# Patient Record
Sex: MALE | Race: AMERICAN INDIAN OR ALASKA NATIVE
[De-identification: names, ages, dates, MRNs, and addresses within clinical notes are randomized per-mention and may not be internally consistent; named-entity substitution may affect disease eponyms.]

---

## 2017-04-14 NOTE — EMERGENCY DEPARTMENT REPORT
ED Abdominal Pain HPI





- General


Chief Complaint: Abdominal Pain


Stated Complaint: SEVERE ABD PAIN/DIABETIC/LOW SUGAR


Time Seen by Provider: 17 19:10


Source: patient


Mode of arrival: Ambulatory


Limitations: No Limitations





- History of Present Illness


MD Complaint: abdominal pain


Onset/Timin


-: Gradual, days(s)


Location: diffuse


Radiation: none


Migration to: no migration


Severity: moderate


Quality: cramping, aching


Consistency: intermittent


Improves With: nothing


Worsens With: nothing


Associated Symptoms: nausea, vomiting.  denies: diarrhea, fever, chills, 

constipation, dysuria, hematemesis, melena, hematuria, anorexia





- Related Data


 Home Medications











 Medication  Instructions  Recorded  Confirmed  Last Taken


 


Lisinopril [Zestril TAB] 40 mg PO QDAY 17


 


NIFEdipine [Nifedipine ER] 60 mg PO DAILY 17











 Allergies











Allergy/AdvReac Type Severity Reaction Status Date / Time


 


No Known Allergies Allergy   Unverified 17 18:43














ED Review of Systems


ROS: 


Stated complaint: SEVERE ABD PAIN/DIABETIC/LOW SUGAR


Other details as noted in HPI





Comment: All other systems reviewed and negative


Gastrointestinal: abdominal pain





ED Past Medical Hx





- Medications


Home Medications: 


 Home Medications











 Medication  Instructions  Recorded  Confirmed  Last Taken  Type


 


Lisinopril [Zestril TAB] 40 mg PO QDAY 17 History


 


NIFEdipine [Nifedipine ER] 60 mg PO DAILY 17 History














ED Physical Exam





- General


Limitations: No Limitations


General appearance: alert, in no apparent distress





- Head


Head exam: Present: atraumatic, normocephalic





- Eye


Eye exam: Present: normal appearance





- ENT


ENT exam: Present: mucous membranes moist





- Neck


Neck exam: Present: normal inspection





- Respiratory


Respiratory exam: Present: normal lung sounds bilaterally.  Absent: respiratory 

distress





- Cardiovascular


Cardiovascular Exam: Present: regular rate, normal rhythm.  Absent: systolic 

murmur, diastolic murmur, rubs, gallop





- GI/Abdominal


GI/Abdominal exam: Present: soft, tenderness (diffuse tenderness), normal bowel 

sounds.  Absent: distended, guarding, rebound, rigid





- Rectal


Rectal exam: Present: deferred





- Extremities Exam


Extremities exam: Present: normal inspection





- Back Exam


Back exam: Present: normal inspection





- Neurological Exam


Neurological exam: Present: alert, oriented X3





- Psychiatric


Psychiatric exam: Present: normal affect, normal mood





- Skin


Skin exam: Present: warm, dry, intact, normal color.  Absent: rash





ED Course


 Vital Signs











  17





  18:40 20:40 20:41


 


Temperature 98.6 F  


 


Pulse Rate 94 H  


 


Respiratory 18 20 20





Rate   


 


Blood Pressure 220/130  


 


O2 Sat by Pulse 100  





Oximetry   














  17





  21:06 21:28


 


Temperature  


 


Pulse Rate 96 H 


 


Respiratory  20





Rate  


 


Blood Pressure 213/133 


 


O2 Sat by Pulse  98





Oximetry  














ED Medical Decision Making





- Lab Data


Result diagrams: 


 17 20:07





 17 20:07





- Medical Decision Making





Patient doing well, labs negative and kub with non specific gas pattern , 

reassess on exam shows no tenderness and he is able to tolerate food here. 





Will dc and follow up.


Critical care attestation.: 


If time is entered above; I have spent that time in minutes in the direct care 

of this critically ill patient, excluding procedure time.








ED Disposition


Clinical Impression: 


 Abdominal pain





Disposition: DISCHARGED TO HOME OR SELFCARE


Is pt being admited?: No


Does the pt Need Aspirin: No


Condition: Good


Instructions:  Abdominal Pain (ED)


Referrals: 


PRIMARY CARE,MD [Primary Care Provider] - 3-5 Days


Time of Disposition: 22:21

## 2017-04-15 NOTE — XRAY REPORT
ABDOMEN TWO VIEWS: 04/14/17 19:21:00



CLINICAL: Abdominal pain, vomiting and diarrhea.  History of a hernia 

repair in 2016.



COMPARISON:None.



FINDINGS: Supine upright views demonstrate a normal bowel gas pattern. 

Stool in the rectum. No distended bowel and no air-fluid levels.  No 

pneumoperitoneum.  No mass or suspicious calcifications. Surgical clips 

in the right upper quadrant. Degenerative change in the spine and hips.



IMPRESSION: Negative abdomen.  Status post cholecystectomy.

## 2019-04-17 ENCOUNTER — HOSPITAL ENCOUNTER (OUTPATIENT)
Dept: HOSPITAL 5 - CATHLABREC | Age: 59
Discharge: HOME | End: 2019-04-17
Attending: RADIOLOGY
Payer: COMMERCIAL

## 2019-04-17 VITALS — SYSTOLIC BLOOD PRESSURE: 161 MMHG | DIASTOLIC BLOOD PRESSURE: 89 MMHG

## 2019-04-17 DIAGNOSIS — I12.0: ICD-10-CM

## 2019-04-17 DIAGNOSIS — T82.898A: Primary | ICD-10-CM

## 2019-04-17 DIAGNOSIS — T82.590A: ICD-10-CM

## 2019-04-17 DIAGNOSIS — Z79.899: ICD-10-CM
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
## 2019-04-17 DIAGNOSIS — Z99.2: ICD-10-CM

## 2019-04-17 DIAGNOSIS — I73.9: ICD-10-CM

## 2019-04-17 DIAGNOSIS — Z98.890: ICD-10-CM

## 2019-04-17 DIAGNOSIS — Z79.4: ICD-10-CM
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
## 2019-04-17 DIAGNOSIS — N18.6: ICD-10-CM

## 2019-04-17 PROCEDURE — 84132 ASSAY OF SERUM POTASSIUM: CPT

## 2019-04-17 PROCEDURE — 36415 COLL VENOUS BLD VENIPUNCTURE: CPT

## 2019-04-17 PROCEDURE — C1725 CATH, TRANSLUMIN NON-LASER: HCPCS

## 2019-04-17 PROCEDURE — 76937 US GUIDE VASCULAR ACCESS: CPT

## 2019-04-17 PROCEDURE — C1751 CATH, INF, PER/CENT/MIDLINE: HCPCS

## 2019-04-17 PROCEDURE — C1894 INTRO/SHEATH, NON-LASER: HCPCS

## 2019-04-17 PROCEDURE — 36902 INTRO CATH DIALYSIS CIRCUIT: CPT

## 2019-04-17 PROCEDURE — C1769 GUIDE WIRE: HCPCS

## 2019-04-17 RX ADMIN — LIDOCAINE HYDROCHLORIDE ONE ML: 20 INJECTION, SOLUTION INFILTRATION; PERINEURAL at 11:18

## 2019-04-17 RX ADMIN — LIDOCAINE HYDROCHLORIDE ONE ML: 20 INJECTION, SOLUTION INFILTRATION; PERINEURAL at 11:32

## 2019-04-17 NOTE — SHORT STAY SUMMARY
Short Stay Documentation


Date of service: 04/17/19


Narrative H&P: 





58 year old male with ESRD and PVD with left lower extremity nonhealing 

ulceration on HBOT and right AVF malfunction with poor clearance.





- History


Principal diagnosis: AVF malfunction


H&P: obtained from office


Past Medical History: dialysis, ESRD


Past Surgical History: Other (AVF malfunction, prior left leg intervention, 5th 

digit amputation)





- Allergies and Medications


Current Medications: 


                                    Allergies





No Known Allergies Allergy (Unverified 04/14/17 18:43)


   





                                Home Medications











 Medication  Instructions  Recorded  Confirmed  Last Taken  Type


 


Carvedilol [Coreg] 3.125 mg PO BID 04/17/19 04/17/19 04/16/19 History





    3.125mg 


 


Ergocalciferol [Vitamin D2] 1 cap PO 1XW 04/17/19 04/17/19 04/15/19 History





    1 


 


Gabapentin [Neurontin] 100 mg PO TID 04/17/19 04/17/19 04/16/19 History





    100mg 


 


Insulin Aspart [NovoLOG Flexpen] 5 units SC TID 04/17/19 04/17/19 04/16/19 

History





    5 units 














- Physical exam


General appearance: no acute distress


Lungs: Normal air movement


Heart: Regular rate


Gastrointestinal: normal


Extremities: normal temperature, normal color, abnormal (right brachiobasilic 

AVF with good thrill, poor clearance per clinic ; left foot ulceration)





- Brief post op/procedure progress note


Date of procedure: 04/17/19


Pre-op diagnosis: AVF malfunction


Post-op diagnosis: same


Procedure: 





Fistulogram with angioplasty


Anesthesia: local


Findings: 





fistulogram with angioplasty


Surgeon: PRINCE ANTONY


Estimated blood loss: minimal


Condition: stable





- Hospital course


Hospital course: 





ready for discharge, done with local





- Disposition


Condition at discharge: Good


Disposition: DC-01 TO HOME OR SELFCARE





- Discharge Diagnoses


(1) Malfunction of arteriovenous dialysis fistula


Status: Acute   





(2) ESRD (end stage renal disease)


Status: Acute   





Short Stay Discharge Plan


Activity: advance as tolerated


Weight Bearing Status: Weight Bear as Tolerated


Diet: renal


Wound: keep clean and dry


Additional Instructions: 


Followup this tuesday.


Follow up with: 


YAMILEX FARIAS MD [Primary Care Provider] - 7 Days


Forms:  AVG Arteriogram D/CInstruction

## 2019-04-17 NOTE — OPERATIVE REPORT
Operative Report


Operative Report: 





EXAM: 


1.  Ultrasound guided access of the basilic vein towards the anastomosis


2.  First order selection of the brachial artery with angiography of the right 

upper extremity


3.  Fistulogram


4.  Angioplasty of the arterial anastomosis with a 6 mm x 40 mm angioplasty 

balloon


5.  Angioplasty of the basilic vein at its peripheral and midportion with an 8 

mm x 60 mm angioplasty balloon


6.  Ultrasound-guided access of the basilic vein towards the venous outflow


7.  Angioplasty of the peripheral subclavian vein/central axillary vein with a 

12 mm x 60 mm angioplasty balloon





DATE: 4/17/19





: PRINCE ANTONY MD





INDICATION: AV fistula malfunction with poor clearances





MEDICATIONS: Please see nursing report for full details.





DEVICES: 


6 mm x 40 mm angioplasty balloon


8 mm x 60 mm angioplasty balloon


12 mm x 60 mm angioplasty balloon





PROCEDURE: 


The risks, benefits, and alternatives of the procedure were discussed and 

written informed consent was obtained.  The patient was transported in stable 

condition to the angiography suite.  The patient's right arm AV brachial basilic

AV fistula was assessed by ultrasound and was patent.  The patient was prepped 

and draped in a sterile fashion.





Under ultrasound guidance, the right arm AV basilic vein was accessed with a 21-

gauge micropuncture needle.  The area was anesthetized prior to access.  0.018 

inch wire was advanced through the micropuncture needle into the fistula and 

then the needle was exchanged for a 5 [place]uvian transitional dilator.  The inner 

dilator and wire were removed and a 0.035 inch wire was advanced through the 

anastomosis and into the brachial artery in a retrograde manner.  The 

transitional dilator was exchanged for a 7 [place] short sheath.





Angled catheter was advanced over the wire and used a selective brachial artery 

and a retrograde fashion digital subtraction angiography was performed 

demonstrating patency of the brachial artery.  There is patency of the brachial 

artery proximal and distal to the anastomosis with patency of the proximal 

ulnar, interosseous, and radial artery but these were small in size.  The anas

tomosis was 70% narrowed and there was intermittent aneurysmal degeneration and 

50-70% narrowing in the peripheral limit portions of the basilic vein.  The 

central portion of the basilic vein was patent.  The axillary vein was patent.  

At the peripheral portion of the subclavian vein/central portion of the axillary

vein, there was a 90% narrowing.  The rest of the central veins were patent.





6 mm angioplasty balloon is used to perform angioplasty at the anastomosis.  

Subsequently, 8 mm x 60 mm angioplasty balloon was used to perform angioplasty 

of the rest of the basilic vein.  Digital subtraction angiography was performed 

demonstrating 20% residual narrowing of the anastomosis with 10% residual 

narrowing of the rest of the basilic vein with aneurysmal degeneration still 

noted.





Afterwards, under ultrasound guidance, the right arm AV basilic vein was 

accessed with a 21-gauge micropuncture needle.  The area was anesthetized prior 

to access.  0.018 inch wire was advanced through the micropuncture needle into 

the fistula and then the needle was exchanged for a 5 [place]uvian transitional 

dilator.  The inner dilator and wire were removed and a 0.035 inch wire was 

advanced through the venous outflow.  The transitional dilator was exchanged for

a 7 [place] short sheath.





12 mm angioplasty balloon was then used to perform angioplasty at the central 

portion of the axillary vein/peripheral portion of the subclavian vein.  Digital

subtraction angiography was performed demonstrating 10% residual narrowing.





At this point, all wires, catheters, and sheaths were removed.  Each site was 

closed with 3-0 Vicryl suture. Hemostasis was achieved with slight manual 

compression.  The patient was transported from the angiography suite to the 

recovery area in stable condition.





IMPRESSION: 


1.  Angioplasty of the peripheral dialysis access as described above.


2.  Angioplasty of the peripheral portion of the subclavian vein as described 

above.

## 2019-04-25 ENCOUNTER — HOSPITAL ENCOUNTER (OUTPATIENT)
Dept: HOSPITAL 5 - CATHLABREC | Age: 59
Discharge: HOME | End: 2019-04-25
Attending: RADIOLOGY
Payer: COMMERCIAL

## 2019-04-25 VITALS — SYSTOLIC BLOOD PRESSURE: 139 MMHG | DIASTOLIC BLOOD PRESSURE: 79 MMHG

## 2019-04-25 DIAGNOSIS — Z99.2: ICD-10-CM

## 2019-04-25 DIAGNOSIS — I12.0: ICD-10-CM

## 2019-04-25 DIAGNOSIS — Z98.890: ICD-10-CM

## 2019-04-25 DIAGNOSIS — I70.249: ICD-10-CM

## 2019-04-25 DIAGNOSIS — Z79.4: ICD-10-CM

## 2019-04-25 DIAGNOSIS — Z79.899: ICD-10-CM

## 2019-04-25 DIAGNOSIS — Z79.82: ICD-10-CM

## 2019-04-25 DIAGNOSIS — I70.213: Primary | ICD-10-CM

## 2019-04-25 DIAGNOSIS — N18.6: ICD-10-CM

## 2019-04-25 LAB
APTT BLD: 31.2 SEC. (ref 24.2–36.6)
BASOPHILS # (AUTO): 0 K/MM3 (ref 0–0.1)
BASOPHILS NFR BLD AUTO: 0.5 % (ref 0–1.8)
BUN SERPL-MCNC: 33 MG/DL (ref 9–20)
BUN/CREAT SERPL: 5 %
CALCIUM SERPL-MCNC: 8.5 MG/DL (ref 8.4–10.2)
EOSINOPHIL # BLD AUTO: 0.1 K/MM3 (ref 0–0.4)
EOSINOPHIL NFR BLD AUTO: 1.8 % (ref 0–4.3)
HCT VFR BLD CALC: 41.7 % (ref 35.5–45.6)
HEMOLYSIS INDEX: 26
HGB BLD-MCNC: 13.6 GM/DL (ref 11.8–15.2)
INR PPP: 0.97 (ref 0.87–1.13)
LYMPHOCYTES # BLD AUTO: 2.7 K/MM3 (ref 1.2–5.4)
LYMPHOCYTES NFR BLD AUTO: 36.1 % (ref 13.4–35)
MCHC RBC AUTO-ENTMCNC: 33 % (ref 32–34)
MCV RBC AUTO: 88 FL (ref 84–94)
MONOCYTES # (AUTO): 0.8 K/MM3 (ref 0–0.8)
MONOCYTES % (AUTO): 10.9 % (ref 0–7.3)
PLATELET # BLD: 202 K/MM3 (ref 140–440)
RBC # BLD AUTO: 4.76 M/MM3 (ref 3.65–5.03)

## 2019-04-25 PROCEDURE — 37229: CPT

## 2019-04-25 PROCEDURE — 85610 PROTHROMBIN TIME: CPT

## 2019-04-25 PROCEDURE — 85730 THROMBOPLASTIN TIME PARTIAL: CPT

## 2019-04-25 PROCEDURE — 85025 COMPLETE CBC W/AUTO DIFF WBC: CPT

## 2019-04-25 PROCEDURE — 99156 MOD SED OTH PHYS/QHP 5/>YRS: CPT

## 2019-04-25 PROCEDURE — 80048 BASIC METABOLIC PNL TOTAL CA: CPT

## 2019-04-25 PROCEDURE — C1714 CATH, TRANS ATHERECTOMY, DIR: HCPCS

## 2019-04-25 PROCEDURE — C1725 CATH, TRANSLUMIN NON-LASER: HCPCS

## 2019-04-25 PROCEDURE — 75625 CONTRAST EXAM ABDOMINL AORTA: CPT

## 2019-04-25 PROCEDURE — 36415 COLL VENOUS BLD VENIPUNCTURE: CPT

## 2019-04-25 PROCEDURE — 99157 MOD SED OTHER PHYS/QHP EA: CPT

## 2019-04-25 PROCEDURE — 75716 ARTERY X-RAYS ARMS/LEGS: CPT

## 2019-04-25 PROCEDURE — C1769 GUIDE WIRE: HCPCS

## 2019-04-25 PROCEDURE — C1887 CATHETER, GUIDING: HCPCS

## 2019-04-25 PROCEDURE — C1760 CLOSURE DEV, VASC: HCPCS

## 2019-04-25 RX ADMIN — MIDAZOLAM ONE MG: 1 INJECTION INTRAMUSCULAR; INTRAVENOUS at 08:43

## 2019-04-25 RX ADMIN — MIDAZOLAM ONE MG: 1 INJECTION INTRAMUSCULAR; INTRAVENOUS at 08:46

## 2019-04-25 RX ADMIN — FENTANYL CITRATE ONE MCG: 50 INJECTION, SOLUTION INTRAMUSCULAR; INTRAVENOUS at 08:46

## 2019-04-25 RX ADMIN — FENTANYL CITRATE ONE MCG: 50 INJECTION, SOLUTION INTRAMUSCULAR; INTRAVENOUS at 08:43

## 2019-04-25 NOTE — SHORT STAY SUMMARY
Short Stay Documentation


Date of service: 04/25/19


Narrative H&P: 





58 year old male with ESRD and nonhealing left charcot joint with PVD with 

ulceration.





- History


Principal diagnosis: PVD with ulceration


H&P: obtained from office


Past Medical History: diabetes, dialysis, PVD


Past Surgical History: Other (AV access creation, prior angioplasty by outside 

physician)


Social history: no significant social history





- Allergies and Medications


Current Medications: 


                                    Allergies





No Known Allergies Allergy (Unverified 04/14/17 18:43)


   





                                Home Medications











 Medication  Instructions  Recorded  Confirmed  Last Taken  Type


 


Carvedilol [Coreg] 3.125 mg PO BID 04/17/19 04/25/19 04/24/19 History


 


Ergocalciferol [Vitamin D2] 1 cap PO 1XW 04/17/19 04/25/19 04/22/19 History


 


Gabapentin [Neurontin] 100 mg PO TID 04/17/19 04/25/19 04/24/19 History


 


Insulin Aspart [NovoLOG Flexpen] 5 units SC TIDAC 04/17/19 04/25/19 04/24/19 

History








Active Medications





Sodium Chloride (Nacl 0.9% 500 Ml)  500 mls @ 50 mls/hr IV AS DIRECT CARLOS











- Physical exam


General appearance: no acute distress


Lungs: Normal air movement


Gastrointestinal: normal


Extremities: normal temperature, normal color, abnormal (left foot ulcer)





- Brief post op/procedure progress note


Date of procedure: 04/25/19


Pre-op diagnosis: PVD with ulceration


Post-op diagnosis: same


Procedure: 





revascularization of the left lower extremity


Anesthesia: local (w/ conscious sedation)


Surgeon: PRINCE ANTONY


Estimated blood loss: minimal


Condition: stable





- Hospital course


Hospital course: 





Tolerated procedure well. No issues. Loaded with plavix and aspirin.





- Disposition


Condition at discharge: Stable


Disposition: DC-01 TO HOME OR SELFCARE





- Discharge Diagnoses


(1) Atherosclerosis of left lower extremity with ulceration


Status: Acute   





Short Stay Discharge Plan


Activity: advance as tolerated


Weight Bearing Status: Weight Bear as Tolerated


Diet: renal


Wound: keep clean and dry, other (remove pressure dressing 4/26/19 in AM)


Follow up with: 


PRINCE ANTONY MD [Staff Physician] - 7 Days


YAMILEX FARIAS MD [Primary Care Provider] - 7 Days


Forms:  Post Arteriogram Instruct


Prescriptions: 


Aspirin EC [Aspirin Enteric Coated TAB] 81 mg PO QDAY #30 tablet.


Clopidogrel [Plavix] 75 mg PO QDAY #30 tablet

## 2019-04-25 NOTE — OPERATIVE REPORT
Operative Report


Operative Report: 





EXAM:


1.  Ultrasound-guided access of the right common femoral artery.


2.  Angiography of the right lower extremity.


3.  Selection of the abdominal aorta with angiography.


4.  Selection of the left external iliac artery, common femoral artery, 

superficial femoral artery and popliteal artery, and anterior tibial artery with

angiography of the left lower extremity


5.  Fluoroscopic guided placement of a 3 mm spider embolic protection device in 

the left anterior tibial artery, distally.


6.  Atherectomy of the left proximal and mid anterior tibial artery with a 

H[place]one S with angioplasty with a 3 mm biology and 50 mm angioplasty balloon and

3.5 mm x 100 mm angioplasty balloon.


7.  Capture of embolic protection device


8.  Closure of the right common femoral artery with a 6 Singaporean Angio-Seal





DATE: 4/25/19





: PRINCE ANTONY MD





INDICATION: Critical limb ischemia of the left lower extremity





MEDICATIONS: Please see nursing report for full details.





DEVICES: 


Hawkone S


3 mm x 150 mm angioplasty balloon


3.5 mm x 100 mm angioplasty balloon


3 mm spider embolic protection device





CONTRAST: Please see cath lab report for full details





PROCEDURE: 


Risks, benefits, and alternatives were discussed with the patient; written 

informed consent was obtained.





The right and left common femoral arteries were assessed with ultrasound were 

patent.  Under direct ultrasound guidance, the right common femoral artery was 

accessed with a 21-gauge micro-puncture needle.  0.018 inch wire was passed into

the aorta.  Needle was exchanged for a transitional dilator.  Wire was exchanged

for 0.035 inch wire.  Transitional dilator was exchanged for a 5 Singaporean sheath.





Digital subtraction angiography was performed demonstrating an appropriate 

puncture, above the bifurcation and below the inferior epigastric artery.  The 

right external iliac artery, common femoral artery, profunda femoral artery and 

superficial femoral artery were patent.  Digital subtraction angiography was 

performed demonstrating runoff of the right lower extremity with patency of the 

right popliteal artery and tibioperoneal trunk and patency of the posterior 

tibial artery and peroneal artery.  Anterior tibial artery had multifocal high-

grade narrowings. Distribution into the foot was good although the transit from 

the anterior tibial was quite sluggish.





Catheter was used to select the abdominal aorta.  Digital subtraction 

angiography was performed demonstrating mild narrowing of the renal arteries.  

The abdominal aorta was patent.  The bilateral common iliac arteries were 

patent.  The bilateral internal iliac arteries were patent.  The bilateral 

external iliac arteries were patent.





The left external iliac artery was selected.  The left common femoral artery was

selected.  The left superficial femoral artery was selected.  The left popliteal

artery was selected.  Left anterior tibial artery was selected.  Digital 

subtraction angiography was performed demonstrating patency of the left 

superficial femoral artery and popliteal artery with patency of the tibial 

peroneal trunk and peroneal artery with occlusion of the posterior tibial artery

and severe multifocal 90% and 99% narrowing of the left proximal and mid 

anterior tibial artery with sluggish flow.  The distal runoff of the anterior 

tibial artery was patent. The distal peroneal artery had a large hypertrophic 

branch of posterior communicating artery which appeared to be continuous with 

the common plantar arteries. The distribution into the foot was good although 

the transit from the anterior tibial was sluggish.





The patient was heparinized.  Sheath was exchanged for 6 [place]an 90 cm [place] 

destination positioned in the left popliteal artery.





Left anterior tibial artery was selected and digital subtraction angiography was

performed confirming position.  V 18 wire was passed into the distal anterior 

tibial artery.  3 mm spider wire was then advanced over the wire and deployed in

the left anterior tibial artery.





Atherectomy was performed of the left anterior tibial artery with multiple 

passes with the Celoxicakone S device.  Afterwards, 3 mm x 150 mm angioplasty balloon

was used to perform angioplasty of the left anterior tibial artery.  

Subsequently, 3.5 mm x 100 mm angioplasty balloon was used to perform 

angioplasty of the left proximal to mid anterior tibial artery.  Digital 

subtraction angiography demonstrated less than 10% residual narrowing.  There is

now prompt flow through the anterior tibial artery. 600 micrograms of 

nitroglycerin injected.  Spider wire was then removed and digital subtraction 

angiography demonstrated no change in the pedal runoff/distribution.





All wires, catheters, and sheaths retracted the right external iliac artery.  

Sheath was exchanged for 6 [place]an Angio-Seal which was deployed achieving 

immediate hemostasis.  Ultrasound was used to confirm positioning of the foot 

plate which was in appropriate position.  Patient tolerated procedure well.  No 

immediate postprocedure complication.





FINDINGS: 


Please see procedure note above.





IMPRESSION: 


1. Successful left lower extremity anterior tibial artery atherectomy and 

angioplasty.

## 2019-06-27 ENCOUNTER — HOSPITAL ENCOUNTER (EMERGENCY)
Dept: HOSPITAL 5 - ED | Age: 59
Discharge: HOME | End: 2019-06-27
Payer: COMMERCIAL

## 2019-06-27 VITALS — SYSTOLIC BLOOD PRESSURE: 166 MMHG | DIASTOLIC BLOOD PRESSURE: 90 MMHG

## 2019-06-27 DIAGNOSIS — Z79.899: ICD-10-CM

## 2019-06-27 DIAGNOSIS — Z99.2: ICD-10-CM

## 2019-06-27 DIAGNOSIS — E11.22: ICD-10-CM

## 2019-06-27 DIAGNOSIS — E11.43: Primary | ICD-10-CM

## 2019-06-27 DIAGNOSIS — N18.6: ICD-10-CM

## 2019-06-27 DIAGNOSIS — K31.84: ICD-10-CM

## 2019-06-27 DIAGNOSIS — I12.0: ICD-10-CM

## 2019-06-27 LAB
ALBUMIN SERPL-MCNC: 4.5 G/DL (ref 3.9–5)
ALT SERPL-CCNC: 11 UNITS/L (ref 7–56)
BASOPHILS # (AUTO): 0 K/MM3 (ref 0–0.1)
BASOPHILS NFR BLD AUTO: 0.3 % (ref 0–1.8)
BUN SERPL-MCNC: 38 MG/DL (ref 9–20)
BUN/CREAT SERPL: 4 %
CALCIUM SERPL-MCNC: 8.9 MG/DL (ref 8.4–10.2)
EOSINOPHIL # BLD AUTO: 0 K/MM3 (ref 0–0.4)
EOSINOPHIL NFR BLD AUTO: 0.5 % (ref 0–4.3)
HCT VFR BLD CALC: 39.5 % (ref 35.5–45.6)
HEMOLYSIS INDEX: 4
HGB BLD-MCNC: 13.3 GM/DL (ref 11.8–15.2)
INR PPP: 1.12 (ref 0.87–1.13)
LYMPHOCYTES # BLD AUTO: 2.3 K/MM3 (ref 1.2–5.4)
LYMPHOCYTES NFR BLD AUTO: 28.6 % (ref 13.4–35)
MCHC RBC AUTO-ENTMCNC: 34 % (ref 32–34)
MCV RBC AUTO: 87 FL (ref 84–94)
MONOCYTES # (AUTO): 0.9 K/MM3 (ref 0–0.8)
MONOCYTES % (AUTO): 10.9 % (ref 0–7.3)
PLATELET # BLD: 360 K/MM3 (ref 140–440)
RBC # BLD AUTO: 4.53 M/MM3 (ref 3.65–5.03)

## 2019-06-27 PROCEDURE — 99284 EMERGENCY DEPT VISIT MOD MDM: CPT

## 2019-06-27 PROCEDURE — 80053 COMPREHEN METABOLIC PANEL: CPT

## 2019-06-27 PROCEDURE — 74176 CT ABD & PELVIS W/O CONTRAST: CPT

## 2019-06-27 PROCEDURE — 36415 COLL VENOUS BLD VENIPUNCTURE: CPT

## 2019-06-27 PROCEDURE — 85610 PROTHROMBIN TIME: CPT

## 2019-06-27 PROCEDURE — 85025 COMPLETE CBC W/AUTO DIFF WBC: CPT

## 2019-06-27 PROCEDURE — 96374 THER/PROPH/DIAG INJ IV PUSH: CPT

## 2019-06-27 PROCEDURE — 96375 TX/PRO/DX INJ NEW DRUG ADDON: CPT

## 2019-06-27 NOTE — EMERGENCY DEPARTMENT REPORT
ED Abdominal Pain HPI





- General


Chief Complaint: Abdominal Pain


Stated Complaint: ABD PAIN


Time Seen by Provider: 06/27/19 13:17


Source: patient


Mode of arrival: Ambulatory


Limitations: No Limitations





- History of Present Illness


Initial Comments: 





Mr. Vargas is a very pleasant 58-year-old gentleman with history of end-stage 

renal disease, he receives dialysis Monday Wednesday Friday.  History of 

diabetes mellitus.  He has had a history of gastroparesis for the past 3 years. 

Followed by Dr. Tai GI specialist in [place].  He has left upper quadrant

abdominal pain with nausea.  Pain has been present for the past 1 1/2 day since 

yesterday.  According to Mr. Vargas and his wife, On previous ED visits pain has 

resolved with ketamine or Dilaudid.  Morphine normally does not provide relief. 

Pain is typical for gastroparesis.  He has cramping.  Denies fever.





Dr. Delong nephrologist


Dr. Yamilex Farias PCP in [place]


MD Complaint: abdominal pain


-: Gradual, days(s) (1)


Location: [facility]


Radiation: none


Severity: mild, severe


Severity scale (0 -10): 0


Quality: cramping


Consistency: intermittent


Worsens With: eating


Associated Symptoms: nausea, vomiting





- Related Data


                                Home Medications











 Medication  Instructions  Recorded  Confirmed  Last Taken


 


Carvedilol [Coreg] 3.125 mg PO BID 04/17/19 04/25/19 04/24/19


 


Ergocalciferol [Vitamin D2] 1 cap PO 1XW 04/17/19 04/25/19 04/22/19


 


Gabapentin [Neurontin] 100 mg PO TID 04/17/19 04/25/19 04/24/19


 


Insulin Aspart [NovoLOG Flexpen] 5 units SC TIDAC 04/17/19 04/25/19 04/24/19








                                  Previous Rx's











 Medication  Instructions  Recorded  Last Taken  Type


 


Aspirin EC [Aspirin Enteric Coated 81 mg PO QDAY #30 tablet. 04/25/19 Unknown 

Rx





TAB]    


 


Clopidogrel [Plavix] 75 mg PO QDAY #30 tablet 04/25/19 Unknown Rx


 


Ondansetron [Zofran Odt] 4 mg PO Q8HR PRN #10 tab.rapdis 06/27/19 Unknown Rx











                                    Allergies











Allergy/AdvReac Type Severity Reaction Status Date / Time


 


No Known Allergies Allergy   Unverified 04/14/17 18:43














ED Review of Systems


ROS: 


Stated complaint: ABD PAIN


Other details as noted in HPI





Comment: All other systems reviewed and negative


Constitutional: denies: fever, malaise


Cardiovascular: denies: chest pain


Gastrointestinal: abdominal pain, nausea, vomiting





ED Past Medical Hx





- Past Medical History


Previous Medical History?: Yes


Hx Hypertension: Yes


Hx Diabetes: Yes


Hx Renal Disease: Yes


Hx HIV: No


Additional medical history: gastroporesis





- Surgical History


Past Surgical History?: Yes


Additional Surgical History: hernia repair





- Social History


Smoking Status: Never Smoker


Substance Use Type: None





- Medications


Home Medications: 


                                Home Medications











 Medication  Instructions  Recorded  Confirmed  Last Taken  Type


 


Carvedilol [Coreg] 3.125 mg PO BID 04/17/19 04/25/19 04/24/19 History


 


Ergocalciferol [Vitamin D2] 1 cap PO 1XW 04/17/19 04/25/19 04/22/19 History


 


Gabapentin [Neurontin] 100 mg PO TID 04/17/19 04/25/19 04/24/19 History


 


Insulin Aspart [NovoLOG Flexpen] 5 units SC TIDAC 04/17/19 04/25/19 04/24/19 

History


 


Aspirin EC [Aspirin Enteric Coated 81 mg PO QDAY #30 tablet.dr 04/25/19  Unknown

 Rx





TAB]     


 


Clopidogrel [Plavix] 75 mg PO QDAY #30 tablet 04/25/19  Unknown Rx


 


Ondansetron [Zofran Odt] 4 mg PO Q8HR PRN #10 tab.rapdis 06/27/19  Unknown Rx














ED Physical Exam





- General


Limitations: No Limitations


General appearance: alert, in no apparent distress





- Head


Head exam: Present: atraumatic, normocephalic





- Eye


Eye exam: Present: normal appearance





- ENT


ENT exam: Present: mucous membranes moist





- Neck


Neck exam: Present: normal inspection, full ROM





- Respiratory


Respiratory exam: Present: normal lung sounds bilaterally.  Absent: respiratory 

distress, wheezes, rales, rhonchi





- Cardiovascular


Cardiovascular Exam: Present: regular rate, normal rhythm, normal heart sounds. 

Absent: systolic murmur, diastolic murmur, rubs, gallop





- GI/Abdominal


GI/Abdominal exam: Present: soft, normal bowel sounds.  Absent: distended, 

tenderness, guarding, rebound





- Rectal


Rectal exam: Present: deferred





- Extremities Exam


Extremities exam: Present: normal inspection





- Back Exam


Back exam: Present: normal inspection





- Neurological Exam


Neurological exam: Present: alert, oriented X3





- Psychiatric


Psychiatric exam: Present: normal affect, normal mood





- Skin


Skin exam: Present: warm, dry, intact, normal color.  Absent: rash





ED Course





                                   Vital Signs











  06/27/19 06/27/19 06/27/19





  13:17 16:24 16:31


 


Temperature 97.8 F  97.9 F


 


Pulse Rate 109 H  103 H


 


Respiratory 109 H  20





Rate   


 


Blood Pressure 143/86  176/96


 


Blood Pressure   176/96





[Left]   


 


O2 Sat by Pulse 99 100 99





Oximetry   














  06/27/19 06/27/19 06/27/19





  17:10 17:30 18:00


 


Temperature   


 


Pulse Rate   


 


Respiratory 16  





Rate   


 


Blood Pressure  178/101 178/100


 


Blood Pressure   





[Left]   


 


O2 Sat by Pulse  92 100





Oximetry   














  06/27/19 06/27/19





  18:02 18:30


 


Temperature  


 


Pulse Rate  92 H


 


Respiratory 18 22





Rate  


 


Blood Pressure  166/90


 


Blood Pressure  





[Left]  


 


O2 Sat by Pulse  97





Oximetry  














ED Medical Decision Making





- Lab Data


Result diagrams: 


                                 06/27/19 13:31





                                 06/27/19 13:31








                         Laboratory Results - last 24 hr











  06/27/19 06/27/19 06/27/19





  13:31 13:31 13:31


 


WBC  8.0  


 


RBC  4.53  


 


Hgb  13.3  


 


Hct  39.5  


 


MCV  87  


 


MCH  29  


 


MCHC  34  


 


RDW  18.6 H  


 


Plt Count  360  


 


Lymph % (Auto)  28.6  


 


Mono % (Auto)  10.9 H  


 


Eos % (Auto)  0.5  


 


Baso % (Auto)  0.3  


 


Lymph #  2.3  


 


Mono #  0.9 H  


 


Eos #  0.0  


 


Baso #  0.0  


 


Seg Neutrophils %  59.7  


 


Seg Neutrophils #  4.8  


 


PT   14.1 


 


INR   1.12 


 


Sodium    137


 


Potassium    4.1


 


Chloride    92.5 L


 


Carbon Dioxide    22


 


Anion Gap    27


 


BUN    38 H


 


Creatinine    10.3 H


 


Estimated GFR    6


 


BUN/Creatinine Ratio    4


 


Glucose    255 H


 


Calcium    8.9


 


Total Bilirubin    0.60


 


AST    16


 


ALT    11


 


Alkaline Phosphatase    126


 


Total Protein    9.9 H


 


Albumin    4.5


 


Albumin/Globulin Ratio    0.8














- Medical Decision Making





Mr. Vargas presents with recurrent abdominal pain nausea vomiting.  Pain is 

typical for pain related to gastroparesis.  No indication of peritonitis or 

acute infection.  CBC potassium was normal limits.  He received relief of pain 

with IV hydromorphone.  I prescribed Zofran.  I offered hospitalization 

admission.  He politely declined hospitalization.  He desired to be discharged 

home.


Critical care attestation.: 


If time is entered above; I have spent that time in minutes in the direct care 

of this critically ill patient, excluding procedure time.








ED Disposition


Clinical Impression: 


 Acute abdominal pain, Diabetes mellitus, Gastroparesis, ESRD (end stage renal 

disease)





Disposition: DC-01 TO HOME OR SELFCARE


Is pt being admited?: No


Does the pt Need Aspirin: No


Condition: Stable


Instructions:  Acute Nausea and Vomiting (ED), Acute Abdominal Pain (ED)


Prescriptions: 


Ondansetron [Zofran Odt] 4 mg PO Q8HR PRN #10 tab.rapdis


 PRN Reason: Nausea


Referrals: 


YAMILEX FARIAS MD [Primary Care Provider] - 3-5 Days

## 2019-06-27 NOTE — EMERGENCY DEPARTMENT REPORT
Blank Doc





- Documentation


Documentation: 


58 y o male was at dialysis today when he started experiencing abd cramping

## 2019-06-27 NOTE — CAT SCAN REPORT
CT ABDOMEN PELVIS WITHOUT CONTRAST:



HISTORY:  abdominal pain.



COMPARISON: none.



TECHNIQUE:  Helical CT in 1.25mm intervals without IV contrast. 

Sagittal and coronal reconstructions. 





FINDINGS:



Lung bases: Normal.



Liver: Normal.



Biliary system: Normal.



Pancreas: Normal.



Spleen: Normal.



Kidneys/ureters/bladder: Normal.



Adrenal glands: Normal.



Aorta: Normal.



Intestines: No evidence for bowel obstruction or focal inflammation. A 

surgical suture line is noted in the left abdomen involving mid small 

bowel loops, correlate with history. There are a few scattered cecal 

diverticula but no evidence for diverticulitis.



Appendix: Normal.



Pelvic viscera: Normal.



Ascites: None.



Adenopathy: None.



Musculoskeletal: Mild lumbar spondylosis is noted. There is laxity of 

the anterior abdominal wall in the umbilical region. There appears to 

be multiple small ventral wall defects in this area. Approximately 3 or 

4 small defects are suspected. Some of the defects containing a short 

segment of small bowel and some contain fat. There is no evidence for 

bowel obstruction.





IMPRESSION:

No acute inflammatory process is identified.

Multiple small ventral wall defects containing fat and short segments 

of small bowel are identified in the umbilical region. No evidence for 

inflammation in this area.

Mild cecal diverticulosis.

## 2019-07-03 ENCOUNTER — HOSPITAL ENCOUNTER (INPATIENT)
Dept: HOSPITAL 5 - ED | Age: 59
LOS: 3 days | Discharge: HOME | DRG: 73 | End: 2019-07-06
Attending: INTERNAL MEDICINE | Admitting: INTERNAL MEDICINE
Payer: COMMERCIAL

## 2019-07-03 DIAGNOSIS — I12.0: ICD-10-CM

## 2019-07-03 DIAGNOSIS — Z99.2: ICD-10-CM

## 2019-07-03 DIAGNOSIS — E11.51: ICD-10-CM

## 2019-07-03 DIAGNOSIS — K31.84: ICD-10-CM

## 2019-07-03 DIAGNOSIS — Z79.4: ICD-10-CM

## 2019-07-03 DIAGNOSIS — E11.43: Primary | ICD-10-CM

## 2019-07-03 DIAGNOSIS — E55.9: ICD-10-CM

## 2019-07-03 DIAGNOSIS — K29.70: ICD-10-CM

## 2019-07-03 DIAGNOSIS — N18.6: ICD-10-CM

## 2019-07-03 DIAGNOSIS — Z82.49: ICD-10-CM

## 2019-07-03 DIAGNOSIS — E11.22: ICD-10-CM

## 2019-07-03 DIAGNOSIS — Z79.899: ICD-10-CM

## 2019-07-03 DIAGNOSIS — K44.9: ICD-10-CM

## 2019-07-03 DIAGNOSIS — K21.9: ICD-10-CM

## 2019-07-03 DIAGNOSIS — Z79.82: ICD-10-CM

## 2019-07-03 DIAGNOSIS — J33.8: ICD-10-CM

## 2019-07-03 DIAGNOSIS — Z83.3: ICD-10-CM

## 2019-07-03 DIAGNOSIS — I25.10: ICD-10-CM

## 2019-07-03 DIAGNOSIS — E66.9: ICD-10-CM

## 2019-07-03 DIAGNOSIS — K31.7: ICD-10-CM

## 2019-07-03 DIAGNOSIS — Z90.49: ICD-10-CM

## 2019-07-03 LAB
ALBUMIN SERPL-MCNC: 4.5 G/DL (ref 3.9–5)
ALT SERPL-CCNC: 8 UNITS/L (ref 7–56)
BASOPHILS # (AUTO): 0.1 K/MM3 (ref 0–0.1)
BASOPHILS NFR BLD AUTO: 0.7 % (ref 0–1.8)
BUN SERPL-MCNC: 28 MG/DL (ref 9–20)
BUN/CREAT SERPL: 3 %
CALCIUM SERPL-MCNC: 9.5 MG/DL (ref 8.4–10.2)
EOSINOPHIL # BLD AUTO: 0 K/MM3 (ref 0–0.4)
EOSINOPHIL NFR BLD AUTO: 0.3 % (ref 0–4.3)
HCT VFR BLD CALC: 42.1 % (ref 35.5–45.6)
HEMOLYSIS INDEX: 13
HGB BLD-MCNC: 14.2 GM/DL (ref 11.8–15.2)
LYMPHOCYTES # BLD AUTO: 2 K/MM3 (ref 1.2–5.4)
LYMPHOCYTES NFR BLD AUTO: 23.6 % (ref 13.4–35)
MCHC RBC AUTO-ENTMCNC: 34 % (ref 32–34)
MCV RBC AUTO: 89 FL (ref 84–94)
MONOCYTES # (AUTO): 0.8 K/MM3 (ref 0–0.8)
MONOCYTES % (AUTO): 10 % (ref 0–7.3)
PLATELET # BLD: 295 K/MM3 (ref 140–440)
RBC # BLD AUTO: 4.75 M/MM3 (ref 3.65–5.03)

## 2019-07-03 PROCEDURE — 85027 COMPLETE CBC AUTOMATED: CPT

## 2019-07-03 PROCEDURE — 80074 ACUTE HEPATITIS PANEL: CPT

## 2019-07-03 PROCEDURE — 88305 TISSUE EXAM BY PATHOLOGIST: CPT

## 2019-07-03 PROCEDURE — 80053 COMPREHEN METABOLIC PANEL: CPT

## 2019-07-03 PROCEDURE — C9113 INJ PANTOPRAZOLE SODIUM, VIA: HCPCS

## 2019-07-03 PROCEDURE — 99285 EMERGENCY DEPT VISIT HI MDM: CPT

## 2019-07-03 PROCEDURE — 82962 GLUCOSE BLOOD TEST: CPT

## 2019-07-03 PROCEDURE — 83036 HEMOGLOBIN GLYCOSYLATED A1C: CPT

## 2019-07-03 PROCEDURE — 83690 ASSAY OF LIPASE: CPT

## 2019-07-03 PROCEDURE — 85025 COMPLETE CBC W/AUTO DIFF WBC: CPT

## 2019-07-03 PROCEDURE — 36415 COLL VENOUS BLD VENIPUNCTURE: CPT

## 2019-07-03 PROCEDURE — 80048 BASIC METABOLIC PNL TOTAL CA: CPT

## 2019-07-03 PROCEDURE — 88342 IMHCHEM/IMCYTCHM 1ST ANTB: CPT

## 2019-07-03 RX ADMIN — Medication SCH ML: at 23:31

## 2019-07-03 RX ADMIN — SODIUM CHLORIDE SCH MLS/HR: 0.9 INJECTION, SOLUTION INTRAVENOUS at 23:08

## 2019-07-03 NOTE — EMERGENCY DEPARTMENT REPORT
ED Abdominal Pain HPI





- General


Chief Complaint: Abdominal Pain


Stated Complaint: ABD PAIN/NAUSEA


Time Seen by Provider: 07/03/19 16:29


Source: patient


Mode of arrival: Ambulatory


Limitations: No Limitations





- History of Present Illness


Initial Comments: 





Mr. Vargas is a very pleasant 58-year-old gentleman with history of end-stage 

renal disease on dialysis.  He also has a history of diabetes mellitus and 

gastroparesis.  He receives dialysis Monday Wednesday Friday.  For the last 2-3 

years he has had symptoms of abd pain, n/v with diabetic gastroparesis.  

Followed by Dr. Tai GI specialist in [place].  He has diffuse abdominal 

pain.  Has had nausea and vomiting.  He tolerated his last meal was on Monday 2 

days ago.  On according to Mr. Vargas, on previous ED visitshis pain has resolved 

with ketamine or Dilaudid.  Morphine only does not provide any relief.  Pain is 

typical for gastroparesis.  He has crampy dull pain.  Denies fever.





On previous occasion he has been treated at [facility].  He has decided to

come to our hospital because he no longer received ketamine or Dilaudid.  He 

currently requests Dilaudid or ketamine in order to control his symptoms.





Nephrologist Dr. Sindi Farias PCP in [place]


MD Complaint: abdominal pain


-: Gradual, days(s) (2)


Location: diffuse


Severity: moderate


Severity scale (0 -10): 10


Quality: cramping, aching, dull


Consistency: constant


Improves With: nothing


Worsens With: eating


Associated Symptoms: nausea, vomiting





- Related Data


                                Home Medications











 Medication  Instructions  Recorded  Confirmed  Last Taken


 


Carvedilol [Coreg] 3.125 mg PO BID 04/17/19 04/25/19 04/24/19


 


Ergocalciferol [Vitamin D2] 1 cap PO 1XW 04/17/19 04/25/19 04/22/19


 


Gabapentin [Neurontin] 100 mg PO TID 04/17/19 04/25/19 04/24/19


 


Insulin Aspart [NovoLOG Flexpen] 5 units SC TIDAC 04/17/19 04/25/19 04/24/19








                                  Previous Rx's











 Medication  Instructions  Recorded  Last Taken  Type


 


Aspirin EC [Aspirin Enteric Coated 81 mg PO QDAY #30 tablet. 04/25/19 Unknown 

Rx





TAB]    


 


Clopidogrel [Plavix] 75 mg PO QDAY #30 tablet 04/25/19 Unknown Rx


 


Ondansetron [Zofran Odt] 4 mg PO Q8HR PRN #10 tab.rapdis 06/27/19 Unknown Rx











                                    Allergies











Allergy/AdvReac Type Severity Reaction Status Date / Time


 


No Known Allergies Allergy   Verified 07/03/19 15:01














ED Review of Systems


ROS: 


Stated complaint: ABD PAIN/NAUSEA


Other details as noted in HPI





Comment: All other systems reviewed and negative


Constitutional: malaise.  denies: fever


Gastrointestinal: abdominal pain, nausea, vomiting.  denies: diarrhea





ED Past Medical Hx





- Past Medical History


Previous Medical History?: Yes


Hx Hypertension: Yes


Hx Diabetes: Yes


Hx Renal Disease: Yes (ESRD, HD M-W-F)


Hx HIV: No


Additional medical history: gastroporesis





- Surgical History


Past Surgical History?: Yes


Additional Surgical History: hernia repair. left foot, left ACL





- Social History


Smoking Status: Never Smoker


Substance Use Type: None





- Medications


Home Medications: 


                                Home Medications











 Medication  Instructions  Recorded  Confirmed  Last Taken  Type


 


Carvedilol [Coreg] 3.125 mg PO BID 04/17/19 04/25/19 04/24/19 History


 


Ergocalciferol [Vitamin D2] 1 cap PO 1XW 04/17/19 04/25/19 04/22/19 History


 


Gabapentin [Neurontin] 100 mg PO TID 04/17/19 04/25/19 04/24/19 History


 


Insulin Aspart [NovoLOG Flexpen] 5 units SC TIDAC 04/17/19 04/25/19 04/24/19 

History


 


Aspirin EC [Aspirin Enteric Coated 81 mg PO QDAY #30 tablet. 04/25/19  Unknown

 Rx





TAB]     


 


Clopidogrel [Plavix] 75 mg PO QDAY #30 tablet 04/25/19  Unknown Rx


 


Ondansetron [Zofran Odt] 4 mg PO Q8HR PRN #10 tab.rapdis 06/27/19  Unknown Rx














ED Physical Exam





- General


Limitations: No Limitations


General appearance: alert, in no apparent distress, other (holding half full 

emesis bag)





- Head


Head exam: Present: atraumatic, normocephalic





- Eye


Eye exam: Present: normal appearance





- ENT


ENT exam: Present: mucous membranes moist





- Neck


Neck exam: Present: normal inspection, full ROM





- Respiratory


Respiratory exam: Present: normal lung sounds bilaterally.  Absent: respiratory 

distress, wheezes, rales, rhonchi





- Cardiovascular


Cardiovascular Exam: Present: normal rhythm, tachycardia, normal heart sounds.  

Absent: rubs, gallop





- GI/Abdominal


GI/Abdominal exam: Present: soft, normal bowel sounds.  Absent: distended, 

tenderness, guarding, rebound





- Rectal


Rectal exam: Present: deferred





- Extremities Exam


Extremities exam: Present: normal inspection





- Back Exam


Back exam: Present: normal inspection





- Neurological Exam


Neurological exam: Present: alert, oriented X3





- Psychiatric


Psychiatric exam: Present: normal affect, normal mood





- Skin


Skin exam: Present: warm, dry, intact, normal color.  Absent: rash





ED Course


                                   Vital Signs











  07/03/19





  14:54


 


Temperature 98.2 F


 


Pulse Rate 115 H


 


Respiratory 20





Rate 


 


Blood Pressure 109/74





[Right] 


 


O2 Sat by Pulse 97





Oximetry 














ED Medical Decision Making





- Lab Data


Result diagrams: 


                                 07/03/19 15:10





                                 07/03/19 15:10








                         Laboratory Results - last 24 hr











  07/03/19 07/03/19 07/03/19





  15:05 15:10 15:10


 


WBC   8.3 


 


RBC   4.75 


 


Hgb   14.2 


 


Hct   42.1 


 


MCV   89 


 


MCH   30 


 


MCHC   34 


 


RDW   21.8 H 


 


Plt Count   295 


 


Lymph % (Auto)   23.6 


 


Mono % (Auto)   10.0 H 


 


Eos % (Auto)   0.3 


 


Baso % (Auto)   0.7 


 


Lymph #   2.0 


 


Mono #   0.8 


 


Eos #   0.0 


 


Baso #   0.1 


 


Seg Neutrophils %   65.4 


 


Seg Neutrophils #   5.4 


 


Sodium    133 L


 


Potassium    3.9


 


Chloride    91.6 L


 


Carbon Dioxide    20 L


 


Anion Gap    25


 


BUN    28 H


 


Creatinine    8.5 H


 


Estimated GFR    8


 


BUN/Creatinine Ratio    3


 


Glucose    217 H


 


POC Glucose  184 H  


 


Calcium    9.5


 


Total Bilirubin    1.00


 


AST    14


 


ALT    8


 


Alkaline Phosphatase    123


 


Total Protein    10.1 H


 


Albumin    4.5


 


Albumin/Globulin Ratio    0.8


 


Lipase    64 H














- Medical Decision Making





Mr. Vargas has hx of diabetic gastroparesis.  He presents with abdominal pain and 

intractable nausea/vomiting.  He appears ill and uncomfortable.  He has persis

tent tachycardia.  Will benefit from NPO status and IV treatment.  





Admitted to hospitalist service in fair condition.





Labs notable for normal WBC, increased anion gap, decreased bicarbonate 





CT A/P obtained last week negative for acute process


Critical care attestation.: 


If time is entered above; I have spent that time in minutes in the direct care 

of this critically ill patient, excluding procedure time.








ED Disposition


Clinical Impression: 


 Gastroparesis, Diabetes mellitus, Acute abdominal pain, ESRD (end stage renal 

disease), Intractable nausea and vomiting





Disposition: DC-09 OP ADMIT IP TO THIS HOSP


Is pt being admited?: Yes


Does the pt Need Aspirin: No


Condition: Stable


Referrals: 


YAMILEX FARIAS MD [Primary Care Provider] - 3-5 Days

## 2019-07-03 NOTE — EVENT NOTE
ED Screening Note


Date of service: 07/03/19


Time: 14:55


ED Screening Note: 


59 y/o male comes in for abd pain. Hx/o DM, HTN, ESRD hx/ gastroparesis  10/10 

pain.  





This initial assessment/diagnostic orders/clinical plan/treatment(s) is/are 

subject to change based on patients health status, clinical progression and re-

assessment by fellow clinical providers in the ED. Further treatment and workup 

at subsequent clinical providers discretion. Patient/guardian urged not to elope

from the ED as their condition may be serious if not clinically assessed and 

managed. 





Initial orders include:

## 2019-07-04 LAB
ALBUMIN SERPL-MCNC: 4.3 G/DL (ref 3.9–5)
ALT SERPL-CCNC: 7 UNITS/L (ref 7–56)
BASOPHILS # (AUTO): 0 K/MM3 (ref 0–0.1)
BASOPHILS NFR BLD AUTO: 0.4 % (ref 0–1.8)
BUN SERPL-MCNC: 41 MG/DL (ref 9–20)
BUN/CREAT SERPL: 4 %
CALCIUM SERPL-MCNC: 8.8 MG/DL (ref 8.4–10.2)
EOSINOPHIL # BLD AUTO: 0 K/MM3 (ref 0–0.4)
EOSINOPHIL NFR BLD AUTO: 0.2 % (ref 0–4.3)
HCT VFR BLD CALC: 42.1 % (ref 35.5–45.6)
HEMOLYSIS INDEX: 47
HGB BLD-MCNC: 14.2 GM/DL (ref 11.8–15.2)
LYMPHOCYTES # BLD AUTO: 2 K/MM3 (ref 1.2–5.4)
LYMPHOCYTES NFR BLD AUTO: 20.4 % (ref 13.4–35)
MCHC RBC AUTO-ENTMCNC: 34 % (ref 32–34)
MCV RBC AUTO: 89 FL (ref 84–94)
MONOCYTES # (AUTO): 1.3 K/MM3 (ref 0–0.8)
MONOCYTES % (AUTO): 13.1 % (ref 0–7.3)
PLATELET # BLD: 280 K/MM3 (ref 140–440)
RBC # BLD AUTO: 4.73 M/MM3 (ref 3.65–5.03)

## 2019-07-04 RX ADMIN — PANTOPRAZOLE SODIUM SCH MG: 40 INJECTION, POWDER, FOR SOLUTION INTRAVENOUS at 21:54

## 2019-07-04 RX ADMIN — HYDROMORPHONE HYDROCHLORIDE PRN MG: 1 INJECTION, SOLUTION INTRAMUSCULAR; INTRAVENOUS; SUBCUTANEOUS at 23:19

## 2019-07-04 RX ADMIN — PANTOPRAZOLE SODIUM SCH MG: 40 INJECTION, POWDER, FOR SOLUTION INTRAVENOUS at 09:47

## 2019-07-04 RX ADMIN — HYDROMORPHONE HYDROCHLORIDE PRN MG: 1 INJECTION, SOLUTION INTRAMUSCULAR; INTRAVENOUS; SUBCUTANEOUS at 09:08

## 2019-07-04 RX ADMIN — Medication SCH ML: at 10:46

## 2019-07-04 RX ADMIN — HEPARIN SODIUM SCH UNIT: 5000 INJECTION, SOLUTION INTRAVENOUS; SUBCUTANEOUS at 09:08

## 2019-07-04 RX ADMIN — HYDROMORPHONE HYDROCHLORIDE PRN MG: 1 INJECTION, SOLUTION INTRAMUSCULAR; INTRAVENOUS; SUBCUTANEOUS at 19:43

## 2019-07-04 RX ADMIN — HEPARIN SODIUM SCH UNIT: 5000 INJECTION, SOLUTION INTRAVENOUS; SUBCUTANEOUS at 21:54

## 2019-07-04 RX ADMIN — Medication SCH ML: at 21:55

## 2019-07-04 RX ADMIN — ONDANSETRON PRN MG: 2 INJECTION INTRAMUSCULAR; INTRAVENOUS at 00:38

## 2019-07-04 RX ADMIN — PANTOPRAZOLE SODIUM SCH MG: 40 INJECTION, POWDER, FOR SOLUTION INTRAVENOUS at 03:16

## 2019-07-04 NOTE — PROGRESS NOTE
Assessment and Plan


Assessment and plan: 





(1) Intractable nausea and vomiting 


IV Zofran and IV Reglan


IVfluids at 42 ml/hr bcoz of ESRD


IV Protonix, Esvin, status post








(2) Gastroparesis


NM emptying study not ordered


Symtomatic treatment.


Clear Liquids








(3) Acute abdominal pain


Sec to Gastroparesis


IV Dilaudid 0.5 mg q3 h prn.








(4) IDDM (insulin dependent diabetes mellitus)


SSI Coverage for now 





(5) ESRD (end stage renal disease)


Cont HD


Nephrology 





(6) HTN (hypertension)


Catapres patch initiated


Oral anti hpertensives to be resumed when patient able to tolerate PO








(7) Vitamin D deficiency


Cont Vit D








(8) CAD (coronary artery disease): 


Cont Plavix








(9) DVT prophylaxis


On Heparin and GI prophylaxis





History


Interval history: 





Patient was seen and evaluated this morning, patient still complaining of 

abdominal pain, nausea but no vomiting this morning.  Patient said only Dilaudid

or ketamine is working for his abdominal pain.





Hospitalist Physical





- Physical exam


Narrative exam: 


 Not in cardiopulmonary distress. 


 The patient is obese.


 Vital signs as documented.


 Head exam is unremarkable.


 No scleral icterus .


 Neck is without jugular venous distension, thyromegaly, or carotid bruits. 


 Lungs are clear to auscultation.


Cardiac exam reveals regular rate and  Rhythm. First and second heart sounds 

normal. No murmurs, rubs or gallops. 


Abdominal exam reveals normal bowel sounds, no masses, no organomegaly and no 

aortic enlargement. 


Extremities are nonedematous and both femoral and pedal pulses are normal.


CNS: Alert and oriented 3.  No focal weakness.








- Constitutional


Vitals: 


                                        











Temp Pulse Resp BP Pulse Ox


 


 98.3 F   87   18   117/76   96 


 


 07/04/19 04:53  07/04/19 04:53  07/04/19 04:53  07/04/19 04:53  07/04/19 04:53











General appearance: Present: mild distress, well-nourished





Results





- Labs


CBC & Chem 7: 


                                 07/04/19 05:57





                                 07/04/19 05:57


Labs: 


                             Laboratory Last Values











WBC  10.0 K/mm3 (4.5-11.0)   07/04/19  05:57    


 


RBC  4.73 M/mm3 (3.65-5.03)   07/04/19  05:57    


 


Hgb  14.2 gm/dl (11.8-15.2)   07/04/19  05:57    


 


Hct  42.1 % (35.5-45.6)   07/04/19  05:57    


 


MCV  89 fl (84-94)   07/04/19  05:57    


 


MCH  30 pg (28-32)   07/04/19  05:57    


 


MCHC  34 % (32-34)   07/04/19  05:57    


 


RDW  21.9 % (13.2-15.2)  H  07/04/19  05:57    


 


Plt Count  280 K/mm3 (140-440)   07/04/19  05:57    


 


Lymph % (Auto)  20.4 % (13.4-35.0)   07/04/19  05:57    


 


Mono % (Auto)  13.1 % (0.0-7.3)  H  07/04/19  05:57    


 


Eos % (Auto)  0.2 % (0.0-4.3)   07/04/19  05:57    


 


Baso % (Auto)  0.4 % (0.0-1.8)   07/04/19  05:57    


 


Lymph #  2.0 K/mm3 (1.2-5.4)   07/04/19  05:57    


 


Mono #  1.3 K/mm3 (0.0-0.8)  H  07/04/19  05:57    


 


Eos #  0.0 K/mm3 (0.0-0.4)   07/04/19  05:57    


 


Baso #  0.0 K/mm3 (0.0-0.1)   07/04/19  05:57    


 


Seg Neutrophils %  65.9 % (40.0-70.0)   07/04/19  05:57    


 


Seg Neutrophils #  6.6 K/mm3 (1.8-7.7)   07/04/19  05:57    


 


Sodium  139 mmol/L (137-145)   07/04/19  05:57    


 


Potassium  4.3 mmol/L (3.6-5.0)   07/04/19  05:57    


 


Chloride  97.1 mmol/L ()  L  07/04/19  05:57    


 


Carbon Dioxide  21 mmol/L (22-30)  L  07/04/19  05:57    


 


  25 mmol/L  07/04/19  05:57    


 


BUN  41 mg/dL (9-20)  H  07/04/19  05:57    


 


  11.0 mg/dL (0.8-1.5)  H  07/04/19  05:57    


 


Estimated GFR  6 ml/min  07/04/19  05:57    


 


  4 %  07/04/19  05:57    


 


Glucose  150 mg/dL ()  H  07/04/19  05:57    


 


POC Glucose  145  ()  H  07/04/19  05:45    


 


  8.0 % (4-6)  H  07/03/19  15:10    


 


Calcium  8.8 mg/dL (8.4-10.2)   07/04/19  05:57    


 


  0.70 mg/dL (0.1-1.2)   07/04/19  05:57    


 


AST  14 units/L (5-40)   07/04/19  05:57    


 


ALT  7 units/L (7-56)   07/04/19  05:57    


 


  110 units/L ()   07/04/19  05:57    


 


  9.4 g/dL (6.3-8.2)  H  07/04/19  05:57    


 


  4.3 g/dL (3.9-5)   07/04/19  05:57    


 


  0.8 %  07/04/19  05:57    


 


  64 units/L (13-60)  H  07/03/19  15:10    














Active Medications





- Current Medications


Current Medications: 














Generic Name Dose Route Start Last Admin





  Trade Name Freq  PRN Reason Stop Dose Admin


 


Acetaminophen  650 mg  07/03/19 22:00 





  Tylenol  PO  





  Q4H PRN  





  Pain MILD(1-3)/Fever >100.5/HA  


 


Clonidine HCl  0.2 mg  07/03/19 22:15  07/03/19 23:07





  Catapres-Tts Patch  TD   Not Given





  We CARLOS  


 


Heparin Sodium (Porcine)  5,000 unit  07/04/19 10:00  07/04/19 09:08





  Heparin  SUB-Q   5,000 unit





  Q12HR CARLOS   Administration


 


Hydromorphone HCl  0.5 mg  07/03/19 22:00  07/04/19 09:08





  Dilaudid  IV   0.5 mg





  Q3H PRN   Administration





  Pain , Severe (7-10)  


 


Sodium Chloride  1,000 mls @ 42 mls/hr  07/03/19 22:00  07/03/19 23:08





  Nacl 0.9% 1000 Ml  IV   42 mls/hr





  AS DIRECT CARLOS   Administration


 


Sodium Chloride  100 mls @ 999 mls/hr  07/04/19 08:07 





  Nacl 0.9%  IV  





  JORDAN PRN  





  Hypotension  


 


Insulin Human Lispro  0 unit  07/04/19 00:00  07/04/19 05:54





  Humalog  SUB-Q   Not Given





  Q6HR [facility]  





  Protocol  


 


Metoclopramide HCl  5 mg  07/03/19 22:00 





  Reglan  IV  





  Q6H PRN  





  Nausea And Vomiting  


 


Ondansetron HCl  4 mg  07/03/19 22:00  07/04/19 00:38





  Zofran  IV   4 mg





  Q3H PRN   Administration





  Nausea And Vomiting  


 


Pantoprazole Sodium  40 mg  07/04/19 02:00  07/04/19 03:16





  Protonix  IV   40 mg





  BID CARLOS   Administration


 


Sodium Chloride  10 ml  07/03/19 22:00  07/03/19 23:31





  Sodium Chloride Flush Syringe 10 Ml  IV   10 ml





  BID CARLOS   Administration


 


Sodium Chloride  10 ml  07/03/19 22:00 





  Sodium Chloride Flush Syringe 10 Ml  IV  





  PRN PRN  





  LINE FLUSH

## 2019-07-04 NOTE — CONSULTATION
History of Present Illness





- Reason for Consult


Consult date: 07/04/19


end stage renal disease





- History of Present Illness





Very pleasant 58-year-old -American male, with a past medical history 

significant for end-stage renal disease in the setting of diabetes, 

hypertension, well known to our outpatient unit, who currently dialyzes at the 

[facility], presented to the emergency department secondary to symptoms of 

acute abdominal pain, nausea and vomiting.  He has a significant history of 

gastroparesis in the setting of his diabetes.  He has had recurrent issues with 

the gastroparesis that has required multiple hospitalizations in the past.  He 

has seen gastroenterology as an outpatient in [place].  Patient's last 

hemodialysis session was yesterday.  He dialyzes on a Monday Wednesday Friday 

schedule.  He has a right upper extremity AV fistula.  Patient has been on dialy

sis for the past 2 years.





Past History


Past Medical History: diabetes, dialysis, ESRD, hypertension, hyperlipidemia, 

renal failure, other (gastroparesis)


Past Surgical History: cholecystectomy, Other (AV fistular creation)


Social history: no significant social history


Family history: diabetes, hypertension





Medications and Allergies


                                    Allergies











Allergy/AdvReac Type Severity Reaction Status Date / Time


 


No Known Allergies Allergy   Verified 07/03/19 15:01











                                Home Medications











 Medication  Instructions  Recorded  Confirmed  Last Taken  Type


 


Carvedilol [Coreg] 3.125 mg PO BID 04/17/19 04/25/19 04/24/19 History


 


Ergocalciferol [Vitamin D2] 1 cap PO 1XW 04/17/19 04/25/19 04/22/19 History


 


Gabapentin [Neurontin] 100 mg PO TID 04/17/19 04/25/19 04/24/19 History


 


Insulin Aspart [NovoLOG Flexpen] 5 units SC TIDAC 04/17/19 04/25/19 04/24/19 

History


 


Aspirin EC [Aspirin Enteric Coated 81 mg PO QDAY #30 tablet. 04/25/19  Unknown

 Rx





TAB]     


 


Clopidogrel [Plavix] 75 mg PO QDAY #30 tablet 04/25/19  Unknown Rx


 


Ondansetron [Zofran Odt] 4 mg PO Q8HR PRN #10 tab.rapdis 06/27/19  Unknown Rx











Active Meds: 


Active Medications





Acetaminophen (Tylenol)  650 mg PO Q4H PRN


   PRN Reason: Pain MILD(1-3)/Fever >100.5/HA


Clonidine HCl (Catapres-Tts Patch)  0.2 mg TD We [facility]


   Last Admin: 07/03/19 23:07 Dose:  Not Given


   Documented by: 


Heparin Sodium (Porcine) (Heparin)  5,000 unit SUB-Q Q12HR [facility]


Hydromorphone HCl (Dilaudid)  0.5 mg IV Q3H PRN


   PRN Reason: Pain , Severe (7-10)


Sodium Chloride (Nacl 0.9% 1000 Ml)  1,000 mls @ 42 mls/hr IV AS DIRECT [facility]


   Last Admin: 07/03/19 23:08 Dose:  42 mls/hr


   Documented by: 


Insulin Human Lispro (Humalog)  0 unit SUB-Q Q6HR [facility]; Protocol


   Last Admin: 07/04/19 05:54 Dose:  Not Given


   Documented by: 


Metoclopramide HCl (Reglan)  5 mg IV Q6H PRN


   PRN Reason: Nausea And Vomiting


Ondansetron HCl (Zofran)  4 mg IV Q3H PRN


   PRN Reason: Nausea And Vomiting


   Last Admin: 07/04/19 00:38 Dose:  4 mg


   Documented by: 


Pantoprazole Sodium (Protonix)  40 mg IV BID [facility]


   Last Admin: 07/04/19 03:16 Dose:  40 mg


   Documented by: 


Sodium Chloride (Sodium Chloride Flush Syringe 10 Ml)  10 ml IV BID [facility]


   Last Admin: 07/03/19 23:31 Dose:  10 ml


   Documented by: 


Sodium Chloride (Sodium Chloride Flush Syringe 10 Ml)  10 ml IV PRN PRN


   PRN Reason: LINE FLUSH











Review of Systems


All systems: negative


Constitutional: poor appetite


Gastrointestinal: abdominal pain, nausea, vomiting





Exam





- Vital Signs


Vital signs: 


                                   Vital Signs











Temp Pulse Resp BP Pulse Ox


 


 98.2 F   115 H  20   109/74   97 


 


 07/03/19 14:54  07/03/19 14:54  07/03/19 14:54  07/03/19 14:54  07/03/19 14:54














- General Appearance


General appearance: well-developed, well-nourished, appears stated age


EENT: ATNC, PERRL


Neck: Present: neck supple, trachea midline


Respiratory: Clear to Ascultation, Normal Exam


Heart: regular, normal heart rate


Gastrointestinal: Present: normal


Integumentary: no rash, warm and dry


Neurologic: no focal deficit, alert and oriented x3


Musculoskeletal: Present: other (-edema)


Psychiatric: mood/affect appropriate, cooperative





Results





- Lab Results





                                 07/04/19 05:57





                                 07/04/19 05:57


                             Most recent lab results











Calcium  8.8 mg/dL (8.4-10.2)   07/04/19  05:57    














Assessment and Plan





- Patient Problems


(1) Gastroparesis


Current Visit: Yes   Status: Chronic   


Plan to address problem: 


Symptom treatment per primary attending.  Would recommend gastroenterology 

evaluation as inpatient.  We'll continue to follow.








(2) ESRD (end stage renal disease)


Current Visit: Yes   Status: Chronic   


Plan to address problem: 


We'll set patient up for inpatient hemodialysis on a Monday Wednesday Friday 

schedule.








(3) IDDM (insulin dependent diabetes mellitus)


Current Visit: Yes   Status: Chronic   


Plan to address problem: 


Diabetes management per primary attending.








(4) HTN (hypertension)


Current Visit: Yes   Status: Chronic   


Qualifiers: 


   Hypertension type: essential hypertension   Qualified Code(s): I10 - 

Essential (primary) hypertension   


Plan to address problem: 


Monitor on current antihypertensive regimen.

## 2019-07-04 NOTE — CONSULTATION
History of Present Illness





- Reason for Consult


Consult date: 07/04/19


Coffee ground emesis


Requesting physician: FELICITAS ALCANTAR





- History of Present Illness


Mr. Vargas is a 58-year-old man with a history of diabetes and end-stage renal 

diseaseon hemodialysis.  He has a history of gastroparesis diagnosed by his 

primary gastroenterologist, Dr. Cruz.  He presented to the emergency room after

developing abdominal pain with recurrent nausea and vomiting more than 20 times 

after dialysis yesterday.  He noted that the vomitus later on was dark and 

brownish.  He saw no fresh red blood.


Patient states that he has a history of nausea and vomiting as well as abdominal

pain after dialysis for the last 2 weeks.  It was bad enough that he presented 

to the emergency room last weekend was treated as an outpatient.  He has had 

similar problems off and on over the last 2-3 years and undergone an upper 

endoscopy approximately a year ago by Dr. Cruz.  He was diagnosed with g

astroparesis.  Of note, when he develops this abdominal pain which is upper 

abdominal, he notes that only Dilaudid or ketamine seemed to be effective in 

alleviating his symptoms.


Bowel movements are regular on a daily basis.  He denies melena or hematochezia.

 He denies aspirin or non-steroidal usage.  He denies GERD symptoms.  He states 

his diabetes is under fair control with hemoglobin A1c ranging from 6-8.  He 

denies significant weight loss.





Today, he feels well.  He denies abdominal pain nausea or vomiting.  He is 

tolerating clear liquids well.








Past History


Past Medical History: diabetes, dialysis, ESRD, hypertension, hyperlipidemia, 

renal failure, other (gastroparesis)


Past Surgical History: cholecystectomy, Other (AV fistular creation)


Social history: no significant social history


Family history: diabetes, hypertension





Medications and Allergies


                                    Allergies











Allergy/AdvReac Type Severity Reaction Status Date / Time


 


No Known Allergies Allergy   Verified 07/03/19 15:01











                                Home Medications











 Medication  Instructions  Recorded  Confirmed  Last Taken  Type


 


Carvedilol [Coreg] 3.125 mg PO BID 04/17/19 04/25/19 04/24/19 History


 


Ergocalciferol [Vitamin D2] 1 cap PO 1XW 04/17/19 04/25/19 04/22/19 History


 


Gabapentin [Neurontin] 100 mg PO TID 04/17/19 04/25/19 04/24/19 History


 


Insulin Aspart [NovoLOG Flexpen] 5 units SC TIDAC 04/17/19 04/25/19 04/24/19 

History


 


Aspirin EC [Aspirin Enteric Coated 81 mg PO QDAY #30 tablet. 04/25/19  Unknown

 Rx





TAB]     


 


Clopidogrel [Plavix] 75 mg PO QDAY #30 tablet 04/25/19  Unknown Rx


 


Ondansetron [Zofran Odt] 4 mg PO Q8HR PRN #10 tab.rapdis 06/27/19  Unknown Rx











Active Meds: 


Active Medications





Acetaminophen (Tylenol)  650 mg PO Q4H PRN


   PRN Reason: Pain MILD(1-3)/Fever >100.5/HA


Clonidine HCl (Catapres-Tts Patch)  0.2 mg TD We [facility]


   Last Admin: 07/03/19 23:07 Dose:  Not Given


   Documented by: 


Heparin Sodium (Porcine) (Heparin)  5,000 unit SUB-Q Q12HR [facility]


   Last Admin: 07/04/19 09:08 Dose:  5,000 unit


   Documented by: 


Hydromorphone HCl (Dilaudid)  0.5 mg IV Q3H PRN


   PRN Reason: Pain , Severe (7-10)


   Last Admin: 07/04/19 09:08 Dose:  0.5 mg


   Documented by: 


Sodium Chloride (Nacl 0.9% 1000 Ml)  1,000 mls @ 42 mls/hr IV AS DIRECT [facility]


   Last Admin: 07/03/19 23:08 Dose:  42 mls/hr


   Documented by: 


Sodium Chloride (Nacl 0.9%)  100 mls @ 999 mls/hr IV [facility] PRN


   PRN Reason: Hypotension


Insulin Human Lispro (Humalog)  0 unit SUB-Q Q6HR [facility]; Protocol


   Last Admin: 07/04/19 12:42 Dose:  6 unit


   Documented by: 


Metoclopramide HCl (Reglan)  5 mg IV Q6H PRN


   PRN Reason: Nausea And Vomiting


Ondansetron HCl (Zofran)  4 mg IV Q3H PRN


   PRN Reason: Nausea And Vomiting


   Last Admin: 07/04/19 00:38 Dose:  4 mg


   Documented by: 


Pantoprazole Sodium (Protonix)  40 mg IV BID [facility]


   Last Admin: 07/04/19 09:47 Dose:  40 mg


   Documented by: 


Sodium Chloride (Sodium Chloride Flush Syringe 10 Ml)  10 ml IV BID [facility]


   Last Admin: 07/04/19 10:46 Dose:  10 ml


   Documented by: 


Sodium Chloride (Sodium Chloride Flush Syringe 10 Ml)  10 ml IV PRN PRN


   PRN Reason: LINE FLUSH











Review of Systems


All systems: negative (as per HPI)





Exam





- Constitutional


Vitals: 


                                        











Temp Pulse Resp BP Pulse Ox


 


 97.6 F   97 H  20   98/68   98 


 


 07/04/19 11:02  07/04/19 11:02  07/04/19 11:02  07/04/19 11:02  07/04/19 11:02











General appearance: Present: no acute distress





- EENT


Eyes: Present: PERRL, EOM intact


ENT: hearing intact





- Respiratory


Respiratory effort: normal


Respiratory: bilateral: CTA





- Cardiovascular


Rhythm: regular


Heart Sounds: Present: S1 & S2





- Extremities


Extremities: No edema, abnormal (RUE AV fistula, patent with thrill palpated)





- Abdominal


General gastrointestinal: Present: soft, non-tender





Results





- Labs


CBC & Chem 7: 


                                 07/04/19 05:57





                                 07/04/19 05:57


Labs: 


                              Abnormal lab results











  07/03/19 07/03/19 07/04/19 Range/Units





  15:10 20:19 05:45 


 


RDW     (13.2-15.2)  %


 


Mono % (Auto)     (0.0-7.3)  %


 


Mono #     (0.0-0.8)  K/mm3


 


Chloride     ()  mmol/L


 


Carbon Dioxide     (22-30)  mmol/L


 


BUN     (9-20)  mg/dL


 


Creatinine     (0.8-1.5)  mg/dL


 


Glucose     ()  mg/dL


 


POC Glucose   238 H  145 H  ()  


 


Hemoglobin A1c  8.0 H    (4-6)  %


 


Total Protein     (6.3-8.2)  g/dL














  07/04/19 07/04/19 07/04/19 Range/Units





  05:57 05:57 11:12 


 


RDW  21.9 H    (13.2-15.2)  %


 


Mono % (Auto)  13.1 H    (0.0-7.3)  %


 


Mono #  1.3 H    (0.0-0.8)  K/mm3


 


Chloride   97.1 L   ()  mmol/L


 


Carbon Dioxide   21 L   (22-30)  mmol/L


 


BUN   41 H   (9-20)  mg/dL


 


Creatinine   11.0 H   (0.8-1.5)  mg/dL


 


Glucose   150 H   ()  mg/dL


 


POC Glucose    257 H  ()  


 


Hemoglobin A1c     (4-6)  %


 


Total Protein   9.4 H   (6.3-8.2)  g/dL














Assessment and Plan


1.  Coffee ground emesis - described in the ER.  Likely a Radha-Aguilar tear 

given recurrent bouts of nausea and vomiting.  These have resolved.  Patient's 

hemoglobin is normal.  There's been no melena or hematochezia.  No further 

evaluation at this point in time.





2.  Recurrent abdominal pain and nausea and vomiting - etiology unclear.  This 

could be related to gastroparesis, but he has been worse over the last 2 weeks. 

The association after dialysis is noteworthy and there could be a component 

related to electrolyte shifts.  Acid reflux disease is also a consideration.  

Patient is doing well at present and I would advance his diet and monitor.


- chronic PPI empirically


- if this persists, consider altering dialysate to diminish electrolyte 

abnormalities


- adv diet

## 2019-07-04 NOTE — HISTORY AND PHYSICAL REPORT
History of Present Illness


Date of examination: 07/03/19


Date of admission: 


07/03/19 17:20





Chief complaint: 





Vomiting and abd  pain since am.


History of present illness: 


58-year-old gentleman with history of end-stage renal disease on dialysis, 

diabetes mellitus ,HTN,CAD,PN and gastroparesis comes in for abd pain, n/v 

\since AM.Apparently vomited about 20 times since am.  Followed by Dr. Cruz  GI

specialist in [place].  He has diffuse abdominal pain.  Has had nausea and 

vomiting.  He tolerated his last meal was on Monday 2 days ago.  According to 

Mr. Vargas, on previous ED visits his pain has resolved with ketamine or Dilaudid.

 Morphine only does not provide any relief.  Pain is typical for gastroparesis. 

He has crampy dull pain.  Denies fever.Patient came with similar symtoms one 

week ago--was treated in ED and was discharged from ED.Abdominal pain is 

6/110.Sharp in consistency.no exacerbating or relieving factors.





Past Medical History


Previous Medical History?: Yes


Hypertension: Yes


Diabetes: Yes


Renal Disease: Yes (ESRD, HD M-W-F)


Additional medical history: gastroporesis





Surgical History


Past Surgical History?: Yes


Additional Surgical History: hernia repair. left foot, left ACL





Social History    


Smoking Status: Never Smoker


Substance Use Type: None   





Family History


Htn





Medications


Home Medications: 


                                Home Medications











 Medication  Instructions  Recorded  Confirmed  Last Taken  Type


 


Carvedilol [Coreg] 3.125 mg PO BID 04/17/19 04/25/19 04/24/19 History


 


Ergocalciferol [Vitamin D2] 1 cap PO 1XW 04/17/19 04/25/19 04/22/19 History


 


Gabapentin [Neurontin] 100 mg PO TID 04/17/19 04/25/19 04/24/19 History


 


Insulin Aspart [NovoLOG Flexpen] 5 units SC TIDAC 04/17/19 04/25/19 04/24/19 

History


 


Aspirin EC [Aspirin Enteric Coated 81 mg PO QDAY #30 tablet. 04/25/19  Unknown

 Rx





TAB]     


 


Clopidogrel [Plavix] 75 mg PO QDAY #30 tablet 04/25/19  Unknown Rx


 


Ondansetron [Zofran Odt] 4 mg PO Q8HR PRN #10 tab.rapdis 06/27/19  Unknown Rx














Review of Systems


ROS: 


Stated complaint: ABD PAIN/NAUSEA


Other details as noted in HPI





Comment: All other systems reviewed and negative


Constitutional: malaise.  denies: fever


Gastrointestinal: abdominal pain, nausea, vomiting.  denies: diarrhea

















Medications and Allergies


                                    Allergies











Allergy/AdvReac Type Severity Reaction Status Date / Time


 


No Known Allergies Allergy   Verified 07/03/19 15:01











                                Home Medications











 Medication  Instructions  Recorded  Confirmed  Last Taken  Type


 


Carvedilol [Coreg] 3.125 mg PO BID 04/17/19 04/25/19 04/24/19 History


 


Ergocalciferol [Vitamin D2] 1 cap PO 1XW 04/17/19 04/25/19 04/22/19 History


 


Gabapentin [Neurontin] 100 mg PO TID 04/17/19 04/25/19 04/24/19 History


 


Insulin Aspart [NovoLOG Flexpen] 5 units SC TIDAC 04/17/19 04/25/19 04/24/19 

History


 


Aspirin EC [Aspirin Enteric Coated 81 mg PO QDAY #30 tablet. 04/25/19  Unknown

 Rx





TAB]     


 


Clopidogrel [Plavix] 75 mg PO QDAY #30 tablet 04/25/19  Unknown Rx


 


Ondansetron [Zofran Odt] 4 mg PO Q8HR PRN #10 tab.rapdis 06/27/19  Unknown Rx











Active Meds: 


Active Medications





Acetaminophen (Tylenol)  650 mg PO Q4H PRN


   PRN Reason: Pain MILD(1-3)/Fever >100.5/HA


Clonidine HCl (Catapres-Tts Patch)  0.2 mg TD We [facility]


   Last Admin: 07/03/19 23:07 Dose:  Not Given


   Documented by: 


Hydromorphone HCl (Dilaudid)  0.5 mg IV Q3H PRN


   PRN Reason: Pain , Severe (7-10)


Sodium Chloride (Nacl 0.9% 1000 Ml)  1,000 mls @ 42 mls/hr IV AS DIRECT [facility]


   Last Admin: 07/03/19 23:08 Dose:  42 mls/hr


   Documented by: 


Insulin Human Lispro (Humalog)  0 unit SUB-Q Q6HR [facility]; Protocol


   Last Admin: 07/03/19 23:09 Dose:  4 unit


   Documented by: 


Metoclopramide HCl (Reglan)  5 mg IV Q6H PRN


   PRN Reason: Nausea And Vomiting


Ondansetron HCl (Zofran)  4 mg IV Q3H PRN


   PRN Reason: Nausea And Vomiting


Sodium Chloride (Sodium Chloride Flush Syringe 10 Ml)  10 ml IV BID [facility]


   Last Admin: 07/03/19 23:31 Dose:  10 ml


   Documented by: 


Sodium Chloride (Sodium Chloride Flush Syringe 10 Ml)  10 ml IV PRN PRN


   PRN Reason: LINE FLUSH











Exam





- Constitutional


Vitals: 


                                        











Temp Pulse Resp BP Pulse Ox


 


 98.7 F   101 H  20   93/54   97 


 


 07/03/19 23:16  07/03/19 23:16  07/03/19 23:16  07/03/19 23:16  07/03/19 23:16











General appearance: Present: mild distress, well-nourished





- EENT


Eyes: Present: PERRL


ENT: hearing intact, clear oral mucosa





- Neck


Neck: Present: supple, normal ROM





- Respiratory


Respiratory effort: normal


Respiratory: bilateral: CTA





- Cardiovascular


Heart rate: 78


Rhythm: regular


Heart Sounds: Present: S1 & S2.  Absent: rub, click





- Extremities


Extremities: no ischemia, pulses intact, pulses symmetrical, No edema


Peripheral Pulses: within normal limits





- Abdominal


General gastrointestinal: Present: soft, non-tender, non-distended, normal bowel

sounds


Male genitourinary: Present: normal





- Rectal


Rectal Exam: deferred





- Integumentary


Integumentary: Present: clear, warm, dry





- Musculoskeletal


Musculoskeletal: gait normal, strength equal bilaterally





- Psychiatric


Psychiatric: appropriate mood/affect, intact judgment & insight





- Neurologic


Neurologic: CNII-XII intact, moves all extremities





- Allied Health


Allied health notes reviewed: nursing, case management





Results





- Labs


CBC & Chem 7: 


                                 07/03/19 15:10





                                 07/03/19 15:10


Labs: 


                             Laboratory Last Values











WBC  8.3 K/mm3 (4.5-11.0)   07/03/19  15:10    


 


RBC  4.75 M/mm3 (3.65-5.03)   07/03/19  15:10    


 


Hgb  14.2 gm/dl (11.8-15.2)   07/03/19  15:10    


 


Hct  42.1 % (35.5-45.6)   07/03/19  15:10    


 


MCV  89 fl (84-94)   07/03/19  15:10    


 


MCH  30 pg (28-32)   07/03/19  15:10    


 


MCHC  34 % (32-34)   07/03/19  15:10    


 


RDW  21.8 % (13.2-15.2)  H  07/03/19  15:10    


 


Plt Count  295 K/mm3 (140-440)   07/03/19  15:10    


 


Lymph % (Auto)  23.6 % (13.4-35.0)   07/03/19  15:10    


 


Mono % (Auto)  10.0 % (0.0-7.3)  H  07/03/19  15:10    


 


Eos % (Auto)  0.3 % (0.0-4.3)   07/03/19  15:10    


 


Baso % (Auto)  0.7 % (0.0-1.8)   07/03/19  15:10    


 


Lymph #  2.0 K/mm3 (1.2-5.4)   07/03/19  15:10    


 


Mono #  0.8 K/mm3 (0.0-0.8)   07/03/19  15:10    


 


Eos #  0.0 K/mm3 (0.0-0.4)   07/03/19  15:10    


 


Baso #  0.1 K/mm3 (0.0-0.1)   07/03/19  15:10    


 


Seg Neutrophils %  65.4 % (40.0-70.0)   07/03/19  15:10    


 


Seg Neutrophils #  5.4 K/mm3 (1.8-7.7)   07/03/19  15:10    


 


Sodium  133 mmol/L (137-145)  L  07/03/19  15:10    


 


Potassium  3.9 mmol/L (3.6-5.0)   07/03/19  15:10    


 


Chloride  91.6 mmol/L ()  L  07/03/19  15:10    


 


Carbon Dioxide  20 mmol/L (22-30)  L  07/03/19  15:10    


 


  25 mmol/L  07/03/19  15:10    


 


BUN  28 mg/dL (9-20)  H  07/03/19  15:10    


 


  8.5 mg/dL (0.8-1.5)  H  07/03/19  15:10    


 


Estimated GFR  8 ml/min  07/03/19  15:10    


 


  3 %  07/03/19  15:10    


 


Glucose  217 mg/dL ()  H  07/03/19  15:10    


 


POC Glucose  238  ()  H  07/03/19  20:19    


 


  8.0 % (4-6)  H  07/03/19  15:10    


 


Calcium  9.5 mg/dL (8.4-10.2)   07/03/19  15:10    


 


  1.00 mg/dL (0.1-1.2)   07/03/19  15:10    


 


AST  14 units/L (5-40)   07/03/19  15:10    


 


ALT  8 units/L (7-56)   07/03/19  15:10    


 


  123 units/L ()   07/03/19  15:10    


 


  10.1 g/dL (6.3-8.2)  H  07/03/19  15:10    


 


  4.5 g/dL (3.9-5)   07/03/19  15:10    


 


  0.8 %  07/03/19  15:10    


 


  64 units/L (13-60)  H  07/03/19  15:10    














Assessment and Plan


Advance Directives: Yes (full code)


VTE prophylaxis?: Chemical


Plan of care discussed with patient/family: Yes





- Patient Problems


(1) Intractable nausea and vomiting


Current Visit: Yes   Status: Acute   


Plan to address problem: 


IV Zofran and IV Reglan


IVfluids at 42 ml/hr bcoz of ESRD


IV Protonix








(2) Gastroparesis


Current Visit: Yes   Status: Acute   


Plan to address problem: 


NM emptying study not ordered


Symtomatic treatment.


Clear Liquids








(3) Acute abdominal pain


Current Visit: Yes   Status: Acute   


Plan to address problem: 


Sec to Gastroparesis


IV Dilaudid 1 mg q3 h prn.








(4) IDDM (insulin dependent diabetes mellitus)


Current Visit: Yes   Status: Chronic   


Plan to address problem: 


Coverage for now 








(5) ESRD (end stage renal disease)


Current Visit: Yes   Status: Chronic   


Plan to address problem: 


Cont HD








(6) HTN (hypertension)


Current Visit: Yes   Status: Chronic   


Qualifiers: 


   Hypertension type: essential hypertension   Qualified Code(s): I10 - 

Essential (primary) hypertension   


Plan to address problem: 


Catapres patch initiated


Oral anti hpertensives to be resumed when patient able to tolerate PO








(7) Vitamin D deficiency


Current Visit: Yes   Status: Chronic   


Plan to address problem: 


Cont Vit D








(8) CAD (coronary artery disease)


Current Visit: Yes   Status: Chronic   


Qualifiers: 


   Coronary Disease-Associated Artery/Lesion type: native artery   Native vs. 

transplanted heart: native heart   Associated angina: without angina   Qualified

Code(s): I25.10 - Atherosclerotic heart disease of native coronary artery 

without angina pectoris   


Plan to address problem: 


Cont Plavix








(9) DVT prophylaxis


Current Visit: Yes   Status: Acute   


Plan to address problem: 


On Heparin and GI prophylaxis

## 2019-07-05 LAB
ALBUMIN SERPL-MCNC: 4 G/DL (ref 3.9–5)
ALT SERPL-CCNC: 7 UNITS/L (ref 7–56)
BASOPHILS # (AUTO): 0.1 K/MM3 (ref 0–0.1)
BASOPHILS NFR BLD AUTO: 0.6 % (ref 0–1.8)
BUN SERPL-MCNC: 29 MG/DL (ref 9–20)
BUN/CREAT SERPL: 3 %
CALCIUM SERPL-MCNC: 8.4 MG/DL (ref 8.4–10.2)
EOSINOPHIL # BLD AUTO: 0.1 K/MM3 (ref 0–0.4)
EOSINOPHIL NFR BLD AUTO: 0.6 % (ref 0–4.3)
HCT VFR BLD CALC: 38.9 % (ref 35.5–45.6)
HEMOLYSIS INDEX: 4
HGB BLD-MCNC: 13 GM/DL (ref 11.8–15.2)
LYMPHOCYTES # BLD AUTO: 2.3 K/MM3 (ref 1.2–5.4)
LYMPHOCYTES NFR BLD AUTO: 24.3 % (ref 13.4–35)
MCHC RBC AUTO-ENTMCNC: 33 % (ref 32–34)
MCV RBC AUTO: 91 FL (ref 84–94)
MONOCYTES # (AUTO): 1.3 K/MM3 (ref 0–0.8)
MONOCYTES % (AUTO): 13.7 % (ref 0–7.3)
PLATELET # BLD: 261 K/MM3 (ref 140–440)
RBC # BLD AUTO: 4.31 M/MM3 (ref 3.65–5.03)

## 2019-07-05 PROCEDURE — 5A1D70Z PERFORMANCE OF URINARY FILTRATION, INTERMITTENT, LESS THAN 6 HOURS PER DAY: ICD-10-PCS | Performed by: INTERNAL MEDICINE

## 2019-07-05 PROCEDURE — 0DB78ZX EXCISION OF STOMACH, PYLORUS, VIA NATURAL OR ARTIFICIAL OPENING ENDOSCOPIC, DIAGNOSTIC: ICD-10-PCS | Performed by: INTERNAL MEDICINE

## 2019-07-05 RX ADMIN — PANTOPRAZOLE SODIUM SCH MG: 40 INJECTION, POWDER, FOR SOLUTION INTRAVENOUS at 12:30

## 2019-07-05 RX ADMIN — HEPARIN SODIUM SCH UNIT: 5000 INJECTION, SOLUTION INTRAVENOUS; SUBCUTANEOUS at 21:04

## 2019-07-05 RX ADMIN — SODIUM CHLORIDE SCH MLS/HR: 0.9 INJECTION, SOLUTION INTRAVENOUS at 15:17

## 2019-07-05 RX ADMIN — PANTOPRAZOLE SODIUM SCH MG: 40 INJECTION, POWDER, FOR SOLUTION INTRAVENOUS at 21:03

## 2019-07-05 RX ADMIN — SODIUM CHLORIDE SCH MLS/HR: 0.9 INJECTION, SOLUTION INTRAVENOUS at 18:03

## 2019-07-05 RX ADMIN — Medication SCH ML: at 12:31

## 2019-07-05 RX ADMIN — ONDANSETRON PRN MG: 2 INJECTION INTRAMUSCULAR; INTRAVENOUS at 18:14

## 2019-07-05 RX ADMIN — HYDROMORPHONE HYDROCHLORIDE PRN MG: 1 INJECTION, SOLUTION INTRAMUSCULAR; INTRAVENOUS; SUBCUTANEOUS at 21:03

## 2019-07-05 RX ADMIN — HYDROMORPHONE HYDROCHLORIDE PRN MG: 1 INJECTION, SOLUTION INTRAMUSCULAR; INTRAVENOUS; SUBCUTANEOUS at 17:28

## 2019-07-05 RX ADMIN — HEPARIN SODIUM SCH: 5000 INJECTION, SOLUTION INTRAVENOUS; SUBCUTANEOUS at 12:31

## 2019-07-05 NOTE — OPERATIVE REPORT
Operative Report


Operative Report: 





Esophagogastroduodenoscopy Procedure Note 





Date of procedure: 07/05/2019





Endoscopist: Tyree Watts MD





Pre-op diagnosis:  nausea/vomiting





Post-op diagnosis: gastritis, gastric antral polyp





Anesthesia: MAC





Complications: No immediate complications





Estimated blood loss: None





Procedure:  After consent was obtained, the patient was placed in the left 

lateral decubitus position.  The olympus endoscope was inserted into the 

patient's mouth under direct vision, and advanced into the 2nd portion of the 

duodenum without difficulty.  The patient tolerated the procedure well.  The 

views of the mucosa were good.  Patient's vital signs were monitored 

continuously throughout the procedure.  





Findings:





The esophagus showed a small size hiatal hernia. No signs of esophagitis or 

bleeding. .  





Exam of the stomach showed mild gastritis in the antrum without bleeding. There 

was a 1 cm polyp in the antrum. Bx obtained. No definite signs of gastric ulcers

noted. Retroflexion was performed. Cardia, fundus, and body appeared normal.  





The examined part of the duodenum appeared normal.





Impression:


1. Mild antral gastritis. 


2. Antral gastric polyp. Bx obtained. 


3. A small hiatal hernia. 


4. Otherwise, normal exam. 


5. No signs of gastric outlet obstruction. 





Recommendations:


- Continue with PPI. 


- resume diet with clear liquid diet. 


- Can start carafate if continues to have symptoms. 


- will follow.

## 2019-07-05 NOTE — GASTROENTEROLOGY PROGRESS NOTE
Assessment and Plan


1.coffee-ground emesis


 2.recurrent abd pain


 3.N/V


 4.H/o gastroparesis


 5.ESRD on HD





-H/H WNL


-continue to monitor H/H and transfuse as needed


-last EGD by Dr. rubio over a year ago with dx of gastroparesis per pt report


-patient reports recurrent N/V overnight with last episode around 3 am this 

morning with continued dark emesis. Admits to continued abd pain. No 

hematemesis, melena, or hematochezia


-etiology-abd pain/Nausea/Vomiting-likely 2/2 gastroparesis with +/- component 

related to electrolyte shifts. CGE likely 2/2 M-W tear given recurrent bouts of 

N/V.


-will schedule for EGD today for further evaluation


-Keep NPO


-continue PPI, antiemetics, and supportive care 


-avoid narcotics for this may exacerbate symptoms


-optimize glycemic control


-if EGD negative and symptoms persists, consider altering dialysate to diminish 

electrolyte abnormalities


-will follow




















Subjective


Date of service: 07/05/19


Principal diagnosis: vomiting; dark emesis


Interval history: 


No acute distress. Reports recurrent N/V overnight and early this am with 

continued dark emesis. No hematemesis or hematochezia.








Objective





- Constitutional


Vitals: 


                                        











Temp Pulse Resp BP Pulse Ox


 


 97.9 F   83   16   117/76   96 


 


 07/05/19 05:54  07/05/19 05:54  07/05/19 05:54  07/05/19 05:54  07/05/19 05:54











General appearance: no acute distress





- Respiratory


Respiratory effort: normal





- Gastrointestinal


General gastrointestinal: Present: soft, tender (slight TTP), non-distended, 

normal bowel sounds





- Neurologic


Neurological: alert and oriented x3





- Labs


CBC & Chem 7: 


                                 07/04/19 05:57





                                 07/04/19 05:57


Labs: 


                         Laboratory Results - last 24 hr











  07/04/19 07/04/19 07/04/19





  10:34 11:12 17:21


 


POC Glucose   257 H  69 L


 


Hepatitis A IgM Ab  Non-reactive  


 


Hep Bs Antigen  Non-reactive  


 


Hep B Core IgM Ab  Non-reactive  


 


Hepatitis C Antibody  Non-reactive  














  07/04/19 07/04/19 07/05/19





  18:01 22:13 06:03


 


POC Glucose  114 H  208 H  177 H


 


Hepatitis A IgM Ab   


 


Hep Bs Antigen   


 


Hep B Core IgM Ab   


 


Hepatitis C Antibody

## 2019-07-05 NOTE — PROGRESS NOTE
Assessment and Plan





- Patient Problems


(1) Gastroparesis


Current Visit: Yes   Status: Chronic   


Plan to address problem: 


Symptom treatment per primary attending.  Gastroenterology evaluation as 

inpatient noted with plan for EGD today. 








(2) ESRD (end stage renal disease)


Current Visit: Yes   Status: Chronic   


Plan to address problem: 


We'll set patient up for inpatient hemodialysis on a Monday Wednesday Friday 

schedule.








(3) IDDM (insulin dependent diabetes mellitus)


Current Visit: Yes   Status: Chronic   


Plan to address problem: 


Diabetes management per primary attending.








(4) HTN (hypertension)


Current Visit: Yes   Status: Chronic   


Qualifiers: 


   Hypertension type: essential hypertension   Qualified Code(s): I10 - 

Essential (primary) hypertension   


Plan to address problem: 


Monitor on current antihypertensive regimen.








Subjective


Date of service: 07/05/19


Principal diagnosis: vomiting; dark emesis


Interval history: 





No new issues overnight. Pending EGD this afternoon. Tolerated HD well this am. 





Objective





- Vital Signs


Vital signs: 


                               Vital Signs - 12hr











  07/05/19 07/05/19 07/05/19





  05:54 08:00 08:15


 


Temperature 97.9 F 98.0 F 


 


Pulse Rate 83 83 87


 


Respiratory 16 18 





Rate   


 


Blood Pressure 117/76 113/75 102/75


 


O2 Sat by Pulse 96  





Oximetry   














  07/05/19 07/05/19 07/05/19





  08:30 08:35 08:45


 


Temperature   


 


Pulse Rate 86 91 H 91 H


 


Respiratory   





Rate   


 


Blood Pressure 84/61 97/68 97/68


 


O2 Sat by Pulse   





Oximetry   














  07/05/19 07/05/19 07/05/19





  09:00 09:15 09:30


 


Temperature   


 


Pulse Rate 90 97 H 96 H


 


Respiratory   





Rate   


 


Blood Pressure 83/61 91/58 97/63


 


O2 Sat by Pulse   





Oximetry   














  07/05/19 07/05/19 07/05/19





  09:45 10:00 10:15


 


Temperature   


 


Pulse Rate 90 88 85


 


Respiratory   





Rate   


 


Blood Pressure 92/63 113/71 117/74


 


O2 Sat by Pulse   





Oximetry   














  07/05/19 07/05/19 07/05/19





  10:30 10:45 11:00


 


Temperature   


 


Pulse Rate 86 86 84


 


Respiratory   





Rate   


 


Blood Pressure 109/78 111/77 110/76


 


O2 Sat by Pulse   





Oximetry   














  07/05/19 07/05/19





  11:15 11:58


 


Temperature 98.0 F 97.6 F


 


Pulse Rate 90 100 H


 


Respiratory 18 22





Rate  


 


Blood Pressure 133/82 103/73


 


O2 Sat by Pulse  98





Oximetry  














- General Appearance


General appearance: well-developed, well-nourished, appears stated age


EENT: ATNC, PERRL


Neck: no JVD, no thyromegaly


Respiratory: Present: Clear to Ascultation, Normal Exam


Cardiology: regular, S1S2


Gastrointestinal: normal, normoactive bowel sounds


Integumentary: no rash, warm and dry


Neurologic: no focal deficit, no asterixis


Psychiatric: mood/affect appropriate, cooperative





- Lab





                                 07/04/19 05:57





                                 07/04/19 05:57


                             Most recent lab results











Calcium  8.8 mg/dL (8.4-10.2)   07/04/19  05:57    














- Allied health notes


Allied health notes reviewed: nursing





Medications & Allergies





- Medications


Allergies/Adverse Reactions: 


                                    Allergies





No Known Allergies Allergy (Verified 07/03/19 15:01)


   








Home Medications: 


                                Home Medications











 Medication  Instructions  Recorded  Confirmed  Last Taken  Type


 


Carvedilol [Coreg] 3.125 mg PO BID 04/17/19 04/25/19 04/24/19 History


 


Ergocalciferol [Vitamin D2] 1 cap PO 1XW 04/17/19 04/25/19 04/22/19 History


 


Gabapentin [Neurontin] 100 mg PO TID 04/17/19 04/25/19 04/24/19 History


 


Insulin Aspart [NovoLOG Flexpen] 5 units SC TIDAC 04/17/19 04/25/19 04/24/19 

History


 


Aspirin EC [Aspirin Enteric Coated 81 mg PO QDAY #30 tablet.dr 04/25/19  Unknown

 Rx





TAB]     


 


Clopidogrel [Plavix] 75 mg PO QDAY #30 tablet 04/25/19  Unknown Rx


 


Ondansetron [Zofran Odt] 4 mg PO Q8HR PRN #10 tab.rapdis 06/27/19  Unknown Rx











Active Medications: 














Generic Name Dose Route Start Last Admin





  Trade Name Freq  PRN Reason Stop Dose Admin


 


Acetaminophen  650 mg  07/03/19 22:00 





  Tylenol  PO  





  Q4H PRN  





  Pain MILD(1-3)/Fever >100.5/HA  


 


Clonidine HCl  0.2 mg  07/03/19 22:15  07/03/19 23:07





  Catapres-Tts Patch  TD   Not Given





  We CARLOS  


 


Heparin Sodium (Porcine)  5,000 unit  07/04/19 10:00  07/05/19 12:31





  Heparin  SUB-Q   Not Given





  Q12HR CARLOS  


 


Hydromorphone HCl  0.5 mg  07/03/19 22:00  07/04/19 23:19





  Dilaudid  IV   0.5 mg





  Q3H PRN   Administration





  Pain , Severe (7-10)  


 


Sodium Chloride  1,000 mls @ 42 mls/hr  07/03/19 22:00  07/03/19 23:08





  Nacl 0.9% 1000 Ml  IV   42 mls/hr





  AS DIRECT CARLOS   Administration


 


Sodium Chloride  100 mls @ 999 mls/hr  07/04/19 08:07 





  Nacl 0.9%  IV  





  JORDAN PRN  





  Hypotension  


 


Insulin Human Lispro  0 unit  07/04/19 00:00  07/05/19 12:32





  Humalog  SUB-Q   Not Given





  Q6HR [facility]  





  Protocol  


 


Metoclopramide HCl  5 mg  07/03/19 22:00  07/04/19 23:19





  Reglan  IV   5 mg





  Q6H PRN   Administration





  Nausea And Vomiting  


 


Ondansetron HCl  4 mg  07/03/19 22:00  07/04/19 00:38





  Zofran  IV   4 mg





  Q3H PRN   Administration





  Nausea And Vomiting  


 


Pantoprazole Sodium  40 mg  07/04/19 02:00  07/05/19 12:30





  Protonix  IV   40 mg





  BID CARLOS   Administration


 


Sodium Chloride  10 ml  07/03/19 22:00  07/05/19 12:31





  Sodium Chloride Flush Syringe 10 Ml  IV   10 ml





  BID CARLOS   Administration


 


Sodium Chloride  10 ml  07/03/19 22:00 





  Sodium Chloride Flush Syringe 10 Ml  IV  





  PRN PRN  





  LINE FLUSH

## 2019-07-05 NOTE — PROGRESS NOTE
Assessment and Plan


Assessment and plan: 





(1) Intractable nausea and vomiting 


IV Zofran and IV Reglan


IVfluids at 42 ml/hr bcoz of ESRD


IV Protonix


Patient still has nausea and vomiting


GI will do EGD today





(2) Gastroparesis


NM emptying study not ordered


Symtomatic treatment.


Clear Liquids








(3) Acute abdominal pain


Sec to Gastroparesis


IV Dilaudid 0.5 mg q3 h prn.








(4) IDDM (insulin dependent diabetes mellitus)


SSI Coverage for now 





(5) ESRD (end stage renal disease)


Cont HD


Nephrology 





(6) HTN (hypertension)


Catapres patch initiated


Oral anti hpertensives to be resumed when patient able to tolerate PO








(7) Vitamin D deficiency


Cont Vit D








(8) CAD (coronary artery disease): 


Cont Plavix








(9) DVT prophylaxis


On Heparin and GI prophylaxis


Disposition; follow EGD report and GI recommendations.





History


Interval history: 





Patient was seen and evaluated this morning, patient still complaining of 

abdominal pain, nausea and vomiting.





Hospitalist Physical





- Physical exam


Narrative exam: 


 Not in cardiopulmonary distress. 


 The patient is obese.


 Vital signs as documented.


 Head exam is unremarkable.


 No scleral icterus .


 Neck is without jugular venous distension, thyromegaly, or carotid bruits. 


 Lungs are clear to auscultation.


Cardiac exam reveals regular rate and  Rhythm. First and second heart sounds 

normal. No murmurs, rubs or gallops. 


Abdominal exam reveals normal bowel sounds, no masses, no organomegaly and no 

aortic enlargement. 


Extremities are nonedematous and both femoral and pedal pulses are normal.


CNS: Alert and oriented 3.  No focal weakness.








- Constitutional


Vitals: 


                                        











Temp Pulse Resp BP Pulse Ox


 


 98.0 F   90   18   133/82   96 


 


 07/05/19 11:15  07/05/19 11:15  07/05/19 11:15  07/05/19 11:15  07/05/19 05:54











General appearance: Present: no acute distress





Results





- Labs


CBC & Chem 7: 


                                 07/04/19 05:57





                                 07/04/19 05:57


Labs: 


                             Laboratory Last Values











WBC  10.0 K/mm3 (4.5-11.0)   07/04/19  05:57    


 


RBC  4.73 M/mm3 (3.65-5.03)   07/04/19  05:57    


 


Hgb  14.2 gm/dl (11.8-15.2)   07/04/19  05:57    


 


Hct  42.1 % (35.5-45.6)   07/04/19  05:57    


 


MCV  89 fl (84-94)   07/04/19  05:57    


 


MCH  30 pg (28-32)   07/04/19  05:57    


 


MCHC  34 % (32-34)   07/04/19  05:57    


 


RDW  21.9 % (13.2-15.2)  H  07/04/19  05:57    


 


Plt Count  280 K/mm3 (140-440)   07/04/19  05:57    


 


Lymph % (Auto)  20.4 % (13.4-35.0)   07/04/19  05:57    


 


Mono % (Auto)  13.1 % (0.0-7.3)  H  07/04/19  05:57    


 


Eos % (Auto)  0.2 % (0.0-4.3)   07/04/19  05:57    


 


Baso % (Auto)  0.4 % (0.0-1.8)   07/04/19  05:57    


 


Lymph #  2.0 K/mm3 (1.2-5.4)   07/04/19  05:57    


 


Mono #  1.3 K/mm3 (0.0-0.8)  H  07/04/19  05:57    


 


Eos #  0.0 K/mm3 (0.0-0.4)   07/04/19  05:57    


 


Baso #  0.0 K/mm3 (0.0-0.1)   07/04/19  05:57    


 


Seg Neutrophils %  65.9 % (40.0-70.0)   07/04/19  05:57    


 


Seg Neutrophils #  6.6 K/mm3 (1.8-7.7)   07/04/19  05:57    


 


Sodium  139 mmol/L (137-145)   07/04/19  05:57    


 


Potassium  4.3 mmol/L (3.6-5.0)   07/04/19  05:57    


 


Chloride  97.1 mmol/L ()  L  07/04/19  05:57    


 


Carbon Dioxide  21 mmol/L (22-30)  L  07/04/19  05:57    


 


  25 mmol/L  07/04/19  05:57    


 


BUN  41 mg/dL (9-20)  H  07/04/19  05:57    


 


  11.0 mg/dL (0.8-1.5)  H  07/04/19  05:57    


 


Estimated GFR  6 ml/min  07/04/19  05:57    


 


  4 %  07/04/19  05:57    


 


Glucose  150 mg/dL ()  H  07/04/19  05:57    


 


POC Glucose  144  ()  H  07/05/19  12:07    


 


  8.0 % (4-6)  H  07/03/19  15:10    


 


Calcium  8.8 mg/dL (8.4-10.2)   07/04/19  05:57    


 


  0.70 mg/dL (0.1-1.2)   07/04/19  05:57    


 


AST  14 units/L (5-40)   07/04/19  05:57    


 


ALT  7 units/L (7-56)   07/04/19  05:57    


 


  110 units/L ()   07/04/19  05:57    


 


  9.4 g/dL (6.3-8.2)  H  07/04/19  05:57    


 


  4.3 g/dL (3.9-5)   07/04/19  05:57    


 


  0.8 %  07/04/19  05:57    


 


  64 units/L (13-60)  H  07/03/19  15:10    


 


Hepatitis A IgM Ab  Non-reactive  (NonReactive)   07/04/19  10:34    


 


Hep Bs Antigen  Non-reactive  (Negative)   07/04/19  10:34    


 


Hep B Core IgM Ab  Non-reactive  (NonReactive)   07/04/19  10:34    


 


  Non-reactive  (NonReactive)   07/04/19  10:34    














Active Medications





- Current Medications


Current Medications: 














Generic Name Dose Route Start Last Admin





  Trade Name Freq  PRN Reason Stop Dose Admin


 


Acetaminophen  650 mg  07/03/19 22:00 





  Tylenol  PO  





  Q4H PRN  





  Pain MILD(1-3)/Fever >100.5/HA  


 


Clonidine HCl  0.2 mg  07/03/19 22:15  07/03/19 23:07





  Catapres-Tts Patch  TD   Not Given





  We CARLOS  


 


Heparin Sodium (Porcine)  5,000 unit  07/04/19 10:00  07/05/19 12:31





  Heparin  SUB-Q   Not Given





  Q12HR CARLOS  


 


Hydromorphone HCl  0.5 mg  07/03/19 22:00  07/04/19 23:19





  Dilaudid  IV   0.5 mg





  Q3H PRN   Administration





  Pain , Severe (7-10)  


 


Sodium Chloride  1,000 mls @ 42 mls/hr  07/03/19 22:00  07/03/19 23:08





  Nacl 0.9% 1000 Ml  IV   42 mls/hr





  AS DIRECT CARLOS   Administration


 


Sodium Chloride  100 mls @ 999 mls/hr  07/04/19 08:07 





  Nacl 0.9%  IV  





  JORDAN PRN  





  Hypotension  


 


Insulin Human Lispro  0 unit  07/04/19 00:00  07/05/19 12:32





  Humalog  SUB-Q   Not Given





  Q6HR [facility]  





  Protocol  


 


Metoclopramide HCl  5 mg  07/03/19 22:00  07/04/19 23:19





  Reglan  IV   5 mg





  Q6H PRN   Administration





  Nausea And Vomiting  


 


Ondansetron HCl  4 mg  07/03/19 22:00  07/04/19 00:38





  Zofran  IV   4 mg





  Q3H PRN   Administration





  Nausea And Vomiting  


 


Pantoprazole Sodium  40 mg  07/04/19 02:00  07/05/19 12:30





  Protonix  IV   40 mg





  BID CARLOS   Administration


 


Sodium Chloride  10 ml  07/03/19 22:00  07/05/19 12:31





  Sodium Chloride Flush Syringe 10 Ml  IV   10 ml





  BID CARLOS   Administration


 


Sodium Chloride  10 ml  07/03/19 22:00 





  Sodium Chloride Flush Syringe 10 Ml  IV  





  PRN PRN  





  LINE FLUSH  














Nutrition/Malnutrition Assess





- Dietary Evaluation


Nutrition/Malnutrition Findings: 


                                        





Nutrition Notes                                            Start:  07/04/19 

16:08


Freq:                                                      Status: Active       




Protocol:                                                                       




 Document     07/04/19 16:08    (Rec: 07/04/19 16:17    [id number])


 Nutrition Notes


     Need for Assessment generated from:         RN Referral,MST


     Initial or Follow up                        Assessment


     Current Diagnosis                           Coronary Artery Disease,


                                                 Diabetes,Hypertension


     Other Pertinent Diagnosis                   ESRD on HD, Gastroparesis


     Current Diet                                Clear liquid


     Labs/Tests                                  A1c 8


     Pertinent Medications                       Reviewed


     Height                                      6 ft


     Weight                                      111 kg


     Usual Body Weight                           110 kg


     Ideal Body Weight (kg)                      80.90


     BMI                                         33.2


     Subjective/Other Information                Screened for skin risk and


                                                 malnutrition.


                                                 Bryant 19 points. Pt stated


                                                 that PTA his appetite was poor


                                                 and that he ate 2 meals daily


                                                 X 1 week. Stated that his


                                                 appetite is poor now and that


                                                 he drank a few spoonfuls of


                                                 broth, drank some water, and


                                                 at his jello. Admitted to


                                                 nausea but no vomiting. Stated


                                                 dry wt is 110 kg.


                                                 No temporal or orbital wasting


                                                 .


                                                 Noted A1c of 8. Pt already


                                                 familiar w/DM diet education.


                                                 Reviewed typical day of pts


                                                 diet and gave suggestions to


                                                 bring down A1c.


     Burn                                        Absent


     Trauma                                      Absent


     #1


      Nutrition Diagnosis                        Inadequate oral intake


      Etiology                                   decreased apeptite,


                                                 gastroparesis


      As Evidenced by Signs and Symptoms         pt on clear liquid diet


     Is patient on ventilator?                   No


     Is Patient Ambulatory and/or Out of Bed     Yes


     REE-([facility]-ambulatory/OOB) [     2558.400


      [email or web address]]                               


     Kcal/Kg value to use for calculation        19


     Approximate Energy Requirements Using       2109


      kcal/Kg                                    


     Calculation Used for Recommendations        Kcal/kg


     Additional Notes                            Protein Needs: 97-105g (1.2-1.


                                                 3g/kg IBW)


                                                 Fluid Needs: 1 ml/kcal


 Nutrition Intervention


     Change Diet Order:                          Advance diet when medically


                                                 able


     Add Supplement/Snack (indicate name/kcal    Ensure Clear 1 daily


      /protein )                                 


     Provides kCal:                              240


     Provides Protein (gm)                       8


     Goal #1                                     Diet advancement


     Anticipated Discharge Needs:                Unable to determine at this


                                                 time


     Follow-Up By:                               07/08/19


     Additional Comments                         Follow for PO and ONS intakes

## 2019-07-05 NOTE — ANESTHESIA CONSULTATION
Anesthesia Consult and Med Hx


Date of service: 07/05/19





- Airway


Anesthetic Teeth Evaluation: Edentulous


ROM Head & Neck: Adequate


Mental/Hyoid Distance: Adequate


Mallampati Class: Class III


Intubation Access Assessment: Possibly Difficult





- Pulmonary Exam


CTA: Yes





- Cardiac Exam


Cardiac Exam: RRR





- Pre-Operative Health Status


ASA Pre-Surgery Classification: ASA4


Proposed Anesthetic Plan: MAC





- Pulmonary


Hx Smoking: No


Hx Respiratory Symptoms: No


Hx Sleep Apnea: No





- Cardiovascular System


Hx Hypertension: Yes (reports no antihypertensive use since starting HD 2yrs 

ago)


Hx Heart Attack/AMI: No


Hx Percutaneous Transluminal Coronary Angioplasty (PTCA): No


Hx Cardia Arrhythmia: No


Hx Peripheral Vascular Disease: Yes (previously on plavix for PAD; held x2wks)





- Central Nervous System


Hx Seizures: No


CVA: No


Hx Psychiatric Problems: No





- Gastrointestinal


Hx Gastroesophageal Reflux Disease: Yes





- Endocrine


Hx End Stage Renal Disease: Yes (ESRD; last HD today)


Hx Liver Disease: No


Hx Insulin Dependent Diabetes: Yes (complicated by gastroparesis)


Hx Thyroid Disease: No





- Other Systems


Hx Obesity: Yes





- Additional Comments


Anesthesia Medical History Comments: No hx anesthetic complications. Last 

episode vomiting >12hrs prior to procedure. No complaint of nausea.

## 2019-07-06 VITALS — SYSTOLIC BLOOD PRESSURE: 103 MMHG | DIASTOLIC BLOOD PRESSURE: 66 MMHG

## 2019-07-06 LAB
BUN SERPL-MCNC: 34 MG/DL (ref 9–20)
BUN/CREAT SERPL: 3 %
CALCIUM SERPL-MCNC: 8.3 MG/DL (ref 8.4–10.2)
HEMOLYSIS INDEX: 6

## 2019-07-06 RX ADMIN — Medication SCH ML: at 02:29

## 2019-07-06 RX ADMIN — Medication SCH ML: at 11:09

## 2019-07-06 RX ADMIN — HEPARIN SODIUM SCH UNIT: 5000 INJECTION, SOLUTION INTRAVENOUS; SUBCUTANEOUS at 11:09

## 2019-07-06 RX ADMIN — PANTOPRAZOLE SODIUM SCH MG: 40 INJECTION, POWDER, FOR SOLUTION INTRAVENOUS at 11:09

## 2019-07-06 NOTE — PROGRESS NOTE
Assessment and Plan





- Patient Problems


(1) Gastroparesis


Current Visit: Yes   Status: Chronic   


Plan to address problem: 


Symptom treatment per primary attending.  Gastroenterology evaluation as 

inpatient noted with EGD findings noted (gastritis and gastric polyp). 








(2) ESRD (end stage renal disease)


Current Visit: Yes   Status: Chronic   


Plan to address problem: 


We'll set patient up for inpatient hemodialysis on a Monday Wednesday Friday 

schedule.








(3) IDDM (insulin dependent diabetes mellitus)


Current Visit: Yes   Status: Chronic   


Plan to address problem: 


Diabetes management per primary attending.








(4) HTN (hypertension)


Current Visit: Yes   Status: Chronic   


Qualifiers: 


   Hypertension type: essential hypertension   Qualified Code(s): I10 - Ess

ential (primary) hypertension   


Plan to address problem: 


Monitor on current antihypertensive regimen.








Subjective


Date of service: 07/06/19


Principal diagnosis: vomiting; dark emesis


Interval history: 





No acute issues. EGD done with findings of gastritis and gastric polyp. GI recom

mendations noted. 





Objective





- Vital Signs


Vital signs: 


                               Vital Signs - 12hr











  07/06/19 07/06/19





  00:08 06:07


 


Temperature 99.2 F 98.3 F


 


Pulse Rate 98 H 94 H


 


Respiratory 20 18





Rate  


 


Blood Pressure 164/90 91/51


 


O2 Sat by Pulse 99 96





Oximetry  














- General Appearance


General appearance: well-developed, well-nourished, appears stated age


EENT: ATNC, PERRL


Neck: no JVD, no thyromegaly


Respiratory: Present: Clear to Ascultation


Cardiology: regular, S1S2


Gastrointestinal: normal


Integumentary: no rash, warm and dry


Neurologic: no focal deficit, no asterixis, alert and oriented x3


Musculoskeletal: other (-edema )


Psychiatric: mood/affect appropriate, cooperative





- Lab





                                 07/05/19 15:45





                                 07/06/19 04:43


                             Most recent lab results











Calcium  8.3 mg/dL (8.4-10.2)  L  07/06/19  04:43    














- Allied health notes


Allied health notes reviewed: nursing





Medications & Allergies





- Medications


Allergies/Adverse Reactions: 


                                    Allergies





No Known Allergies Allergy (Verified 07/03/19 15:01)


   








Home Medications: 


                                Home Medications











 Medication  Instructions  Recorded  Confirmed  Last Taken  Type


 


Carvedilol [Coreg] 3.125 mg PO BID 04/17/19 04/25/19 04/24/19 History


 


Ergocalciferol [Vitamin D2] 1 cap PO 1XW 04/17/19 04/25/19 04/22/19 History


 


Gabapentin [Neurontin] 100 mg PO TID 04/17/19 04/25/19 04/24/19 History


 


Insulin Aspart [NovoLOG Flexpen] 5 units SC TIDAC 04/17/19 04/25/19 04/24/19 Hi

story


 


Aspirin EC 81 mg PO QDAY #30 tablet. 04/25/19  Unknown Rx


 


Clopidogrel [Plavix] 75 mg PO QDAY #30 tablet 04/25/19  Unknown Rx


 


Ondansetron [Zofran ODT TAB] 4 mg PO Q8HR PRN #10 tab.rapdis 06/27/19  Unknown 

Rx


 


Metoclopramide [Reglan] 10 mg PO ACHS #90 tablet 07/06/19  Unknown Rx











Active Medications: 














Generic Name Dose Route Start Last Admin





  Trade Name Freq  PRN Reason Stop Dose Admin


 


Acetaminophen  650 mg  07/03/19 22:00 





  Tylenol  PO  





  Q4H PRN  





  Pain MILD(1-3)/Fever >100.5/HA  


 


Clonidine HCl  0.2 mg  07/05/19 21:00  07/05/19 21:53





  Catapres-Tts Patch  TD   0.2 mg





  Fr CARLOS   Administration


 


Heparin Sodium (Porcine)  5,000 unit  07/04/19 10:00  07/06/19 11:09





  Heparin  SUB-Q   5,000 unit





  Q12HR CARLOS   Administration


 


Hydromorphone HCl  0.5 mg  07/03/19 22:00  07/05/19 21:03





  Dilaudid  IV   0.5 mg





  Q3H PRN   Administration





  Pain , Severe (7-10)  


 


Sodium Chloride  1,000 mls @ 42 mls/hr  07/03/19 22:00  07/05/19 18:03





  Nacl 0.9% 1000 Ml  IV   42 mls/hr





  AS DIRECT CARLOS   Administration


 


Sodium Chloride  100 mls @ 999 mls/hr  07/04/19 08:07 





  Nacl 0.9%  IV  





  JORDAN PRN  





  Hypotension  


 


Sodium Chloride  1,000 mls @ 50 mls/hr  07/05/19 15:00 





  Nacl 0.9% 1000 Ml  IV  





  AS DIRECT CARLOS  


 


Insulin Human Lispro  0 unit  07/04/19 00:00  07/06/19 08:39





  Humalog  SUB-Q   3 unit





  Q6HR CARLOS   Administration





  Protocol  


 


Metoclopramide HCl  5 mg  07/03/19 22:00  07/04/19 23:19





  Reglan  IV   5 mg





  Q6H PRN   Administration





  Nausea And Vomiting  


 


Ondansetron HCl  4 mg  07/03/19 22:00  07/05/19 18:14





  Zofran  IV   4 mg





  Q3H PRN   Administration





  Nausea And Vomiting  


 


Pantoprazole Sodium  40 mg  07/04/19 02:00  07/06/19 11:09





  Protonix  IV   40 mg





  BID CARLOS   Administration


 


Sodium Chloride  10 ml  07/03/19 22:00  07/06/19 11:09





  Sodium Chloride Flush Syringe 10 Ml  IV   10 ml





  BID CARLOS   Administration


 


Sodium Chloride  10 ml  07/03/19 22:00 





  Sodium Chloride Flush Syringe 10 Ml  IV  





  PRN PRN  





  LINE FLUSH

## 2019-07-06 NOTE — GASTROENTEROLOGY PROGRESS NOTE
Assessment and Plan


1. Intractable nausea/vomiting - improved; tolerating clears.  s/p EGD w/o GOO; 

possibly due to underlying gastroparesis.  advance diet as tolerated.


2. Gastric polyp - f/u path from EGD; pt will f/u with his GI physician (Dr Cruz) for further management 


3. IDDM


4. ESRD





-will sign off, please call as needed or with questions. 








Subjective


Date of service: 07/06/19


Principal diagnosis: vomiting; dark emesis


Interval history: 


pt seen and examined.  tolerating clears; denies abdominal pain








Objective





- Constitutional


Vitals: 


                                        











Temp Pulse Resp BP Pulse Ox


 


 98.3 F   94 H  18   91/51   96 


 


 07/06/19 06:07  07/06/19 06:07  07/06/19 06:07  07/06/19 06:07  07/06/19 06:07











General appearance: no acute distress





- Respiratory


Respiratory effort: normal


Respiratory: bilateral: CTA





- Cardiovascular


Rhythm: regular


Heart Sounds: Present: S1 & S2





- Gastrointestinal


General gastrointestinal: Present: soft, non-tender, non-distended





- Psychiatric


Psychiatric: appropriate mood/affect





- Labs


CBC & Chem 7: 


                                 07/05/19 15:45





                                 07/06/19 04:43


Labs: 


                         Laboratory Results - last 24 hr











  07/05/19 07/05/19 07/05/19





  12:07 15:45 17:15


 


WBC   9.4 


 


RBC   4.31 


 


Hgb   13.0 


 


Hct   38.9 


 


MCV   91 


 


MCH   30 


 


MCHC   33 


 


RDW   22.2 H 


 


Plt Count   261 


 


Lymph % (Auto)   24.3 


 


Mono % (Auto)   13.7 H 


 


Eos % (Auto)   0.6 


 


Baso % (Auto)   0.6 


 


Lymph #   2.3 


 


Mono #   1.3 H 


 


Eos #   0.1 


 


Baso #   0.1 


 


Seg Neutrophils %   60.8 


 


Seg Neutrophils #   5.7 


 


Sodium    136 L


 


Potassium    3.9


 


Chloride    92.8 L


 


Carbon Dioxide    25


 


Anion Gap    22


 


BUN    29 H


 


Creatinine    10.3 H


 


Estimated GFR    6


 


BUN/Creatinine Ratio    3


 


Glucose    181 H


 


POC Glucose  144 H  


 


Calcium    8.4


 


Total Bilirubin    0.60


 


AST    10


 


ALT    7


 


Alkaline Phosphatase    106


 


Total Protein    8.9 H


 


Albumin    4.0


 


Albumin/Globulin Ratio    0.8














  07/05/19 07/06/19 07/06/19





  17:52 00:16 04:43


 


WBC   


 


RBC   


 


Hgb   


 


Hct   


 


MCV   


 


MCH   


 


MCHC   


 


RDW   


 


Plt Count   


 


Lymph % (Auto)   


 


Mono % (Auto)   


 


Eos % (Auto)   


 


Baso % (Auto)   


 


Lymph #   


 


Mono #   


 


Eos #   


 


Baso #   


 


Seg Neutrophils %   


 


Seg Neutrophils #   


 


Sodium    136 L


 


Potassium    3.9


 


Chloride    94.5 L


 


Carbon Dioxide    23


 


Anion Gap    22


 


BUN    34 H


 


Creatinine    11.7 H


 


Estimated GFR    5


 


BUN/Creatinine Ratio    3


 


Glucose    172 H


 


POC Glucose  190 H  177 H 


 


Calcium    8.3 L


 


Total Bilirubin   


 


AST   


 


ALT   


 


Alkaline Phosphatase   


 


Total Protein   


 


Albumin   


 


Albumin/Globulin Ratio   














  07/06/19





  06:16


 


WBC 


 


RBC 


 


Hgb 


 


Hct 


 


MCV 


 


MCH 


 


MCHC 


 


RDW 


 


Plt Count 


 


Lymph % (Auto) 


 


Mono % (Auto) 


 


Eos % (Auto) 


 


Baso % (Auto) 


 


Lymph # 


 


Mono # 


 


Eos # 


 


Baso # 


 


Seg Neutrophils % 


 


Seg Neutrophils # 


 


Sodium 


 


Potassium 


 


Chloride 


 


Carbon Dioxide 


 


Anion Gap 


 


BUN 


 


Creatinine 


 


Estimated GFR 


 


BUN/Creatinine Ratio 


 


Glucose 


 


POC Glucose  158 H


 


Calcium 


 


Total Bilirubin 


 


AST 


 


ALT 


 


Alkaline Phosphatase 


 


Total Protein 


 


Albumin 


 


Albumin/Globulin Ratio

## 2019-07-06 NOTE — DISCHARGE SUMMARY
Providers





- Providers


Date of Admission: 


07/03/19 17:20





Date of discharge: 07/06/19


Attending physician: 


EDWIN ATKINSON





                                        





07/03/19 17:21


Consult to Physician [CONS] Stat 


   Comment: 


   Consulting Provider: HANS GUTIERREZ


   Physician Instructions: 


   Reason For Exam: ESRD





07/03/19 18:23


Consult to Physician [CONS] Stat 


   Comment: 


   Consulting Provider: MARCELLA DWYER


   Physician Instructions: 


   Reason For Exam: gastroparesis dark emesis











Primary care physician: 


YAMILEX FARIAS








Hospitalization


Reason for admission: N/V


Condition: Stable


Hospital course: 





Mr. Vargas is a 58-year-old man with a history of diabetes and end-stage renal 

diseaseon hemodialysis.  He has a history of gastroparesis diagnosed by his 

primary gastroenterologist, Dr. Hernandez.  He presented to the emergency room after

 developing abdominal pain with recurrent nausea and vomiting more than 20 times

 after dialysis the day PTA.  He noted that the vomitus later on was dark and 

brownish.  He saw no fresh red blood.


Patient stated that he has a history of nausea and vomiting as well as abdominal

 pain after dialysis for the last 2 weeks.  Pt was seen by GI and Nephrology.  

Pt was also noted to have intractable nausea/vomiting that later improved.  He 

began tolerating clears.  GI performed EGD w/o GOO;  There was a 1 cm polyp in 

the antrum. Bx obtained. No definite signs of gastric ulcers noted. Retroflexion

 was performed. Cardia, fundus, and body appeared normal.  Etiology was felt be 

sec to possibly underlying gastroparesis. Dedicated d/c 32 minutes


Disposition: DC-01 TO HOME OR SELFCARE


Time spent for discharge: 32





- Discharge Diagnoses


(1) Diabetes mellitus


Status: Acute   





(2) Intractable nausea and vomiting


Status: Acute   





(3) CAD (coronary artery disease)


Status: Chronic   


Qualifiers: 


   Coronary Disease-Associated Artery/Lesion type: native artery   Native vs. 

transplanted heart: native heart   Associated angina: without angina   Qualified

 Code(s): I25.10 - Atherosclerotic heart disease of native coronary artery 

without angina pectoris   





(4) ESRD (end stage renal disease)


Status: Chronic   





(5) Gastroparesis


Status: Chronic   





(6) HTN (hypertension)


Status: Chronic   


Qualifiers: 


   Hypertension type: essential hypertension   Qualified Code(s): I10 - 

Essential (primary) hypertension   





(7) IDDM (insulin dependent diabetes mellitus)


Status: Chronic   





Core Measure Documentation





- Palliative Care


Palliative Care/ Comfort Measures: Not Applicable





- Core Measures


Any of the following diagnoses?: none





Exam





- Constitutional


Vitals: 


                                        











Temp Pulse Resp BP Pulse Ox


 


 98.3 F   94 H  18   91/51   96 


 


 07/06/19 06:07  07/06/19 06:07  07/06/19 06:07  07/06/19 06:07  07/06/19 06:07











General appearance: Present: no acute distress, well-nourished





- EENT


Eyes: Present: PERRL


ENT: hearing intact, clear oral mucosa





- Neck


Neck: Present: supple, normal ROM





- Respiratory


Respiratory effort: normal


Respiratory: bilateral: CTA





- Cardiovascular


Heart Sounds: Present: S1 & S2.  Absent: rub, click





- Extremities


Extremities: pulses symmetrical, No edema


Peripheral Pulses: within normal limits





- Abdominal


General gastrointestinal: Present: soft, non-tender, non-distended, normal bowel

 sounds


Male genitourinary: Present: normal





- Integumentary


Integumentary: Present: clear, warm, dry





- Musculoskeletal


Musculoskeletal: gait normal, strength equal bilaterally





- Psychiatric


Psychiatric: appropriate mood/affect, intact judgment & insight





- Neurologic


Neurologic: CNII-XII intact, moves all extremities





Plan


Activity: no restrictions


Weight Bearing Status: Full Weight Bearing


Diet: diabetic


Follow up with: 


YAMILEX FARIAS MD [Primary Care Provider] - 3-5 Days


SHARAN HERNANDEZ MD [Staff Physician] - 7 Days


Prescriptions: 


Metoclopramide [Reglan] 10 mg PO ACHS #90 tablet

## 2019-11-09 ENCOUNTER — HOSPITAL ENCOUNTER (EMERGENCY)
Dept: HOSPITAL 5 - ED | Age: 59
Discharge: HOME | End: 2019-11-09
Payer: COMMERCIAL

## 2019-11-09 VITALS — DIASTOLIC BLOOD PRESSURE: 93 MMHG | SYSTOLIC BLOOD PRESSURE: 177 MMHG

## 2019-11-09 DIAGNOSIS — E78.00: ICD-10-CM

## 2019-11-09 DIAGNOSIS — N18.6: ICD-10-CM

## 2019-11-09 DIAGNOSIS — E11.22: ICD-10-CM

## 2019-11-09 DIAGNOSIS — Z79.899: ICD-10-CM

## 2019-11-09 DIAGNOSIS — Z99.2: ICD-10-CM

## 2019-11-09 DIAGNOSIS — Z98.890: ICD-10-CM

## 2019-11-09 DIAGNOSIS — I12.0: ICD-10-CM

## 2019-11-09 DIAGNOSIS — E11.43: Primary | ICD-10-CM

## 2019-11-09 DIAGNOSIS — K31.84: ICD-10-CM

## 2019-11-09 LAB
ALBUMIN SERPL-MCNC: 4.3 G/DL (ref 3.9–5)
ALT SERPL-CCNC: 8 UNITS/L (ref 7–56)
BASOPHILS # (AUTO): 0 K/MM3 (ref 0–0.1)
BASOPHILS NFR BLD AUTO: 0.4 % (ref 0–1.8)
BUN SERPL-MCNC: 47 MG/DL (ref 9–20)
BUN/CREAT SERPL: 5 %
CALCIUM SERPL-MCNC: 9.3 MG/DL (ref 8.4–10.2)
EOSINOPHIL # BLD AUTO: 0 K/MM3 (ref 0–0.4)
EOSINOPHIL NFR BLD AUTO: 0.1 % (ref 0–4.3)
HCT VFR BLD CALC: 33.3 % (ref 35.5–45.6)
HEMOLYSIS INDEX: 3
HGB BLD-MCNC: 11.4 GM/DL (ref 11.8–15.2)
LYMPHOCYTES # BLD AUTO: 1.1 K/MM3 (ref 1.2–5.4)
LYMPHOCYTES NFR BLD AUTO: 14.6 % (ref 13.4–35)
MCHC RBC AUTO-ENTMCNC: 34 % (ref 32–34)
MCV RBC AUTO: 88 FL (ref 84–94)
MONOCYTES # (AUTO): 0.4 K/MM3 (ref 0–0.8)
MONOCYTES % (AUTO): 4.7 % (ref 0–7.3)
PLATELET # BLD: 260 K/MM3 (ref 140–440)
RBC # BLD AUTO: 3.79 M/MM3 (ref 3.65–5.03)

## 2019-11-09 PROCEDURE — 96375 TX/PRO/DX INJ NEW DRUG ADDON: CPT

## 2019-11-09 PROCEDURE — 96372 THER/PROPH/DIAG INJ SC/IM: CPT

## 2019-11-09 PROCEDURE — 80053 COMPREHEN METABOLIC PANEL: CPT

## 2019-11-09 PROCEDURE — 85025 COMPLETE CBC W/AUTO DIFF WBC: CPT

## 2019-11-09 PROCEDURE — 99283 EMERGENCY DEPT VISIT LOW MDM: CPT

## 2019-11-09 PROCEDURE — 36415 COLL VENOUS BLD VENIPUNCTURE: CPT

## 2019-11-09 PROCEDURE — 96374 THER/PROPH/DIAG INJ IV PUSH: CPT

## 2019-11-09 NOTE — EMERGENCY DEPARTMENT REPORT
ED N/V/D HPI





- General


Chief complaint: Abdominal Pain


Stated complaint: ABD PAIN


Time Seen by Provider: 11/09/19 16:45


Source: patient


Mode of arrival: Ambulatory


Limitations: No Limitations





- History of Present Illness


Initial comments: 





Patient is a 58-year-old  male who is here stating that he has 

stomach cramps in the epigastrium.  Patient states he has vomited 4 times today.

 Patient has a history of gastroparesis.  Patient states this occurs frequently 

and he is normally admitted.  Patient does have a history of being end-stage r

enal disease and his last dialysis was yesterday.  Patient denies shortness of 

breath.  Patient is asking for Dilaudid which she states is the only thing that 

helps with his gastroparesis.





- Related Data


                                Home Medications











 Medication  Instructions  Recorded  Confirmed  Last Taken


 


Carvedilol [Coreg] 3.125 mg PO BID 04/17/19 09/20/19 09/19/19


 


Ergocalciferol [Vitamin D2] 1 cap PO 1XW 04/17/19 09/20/19 09/19/19


 


Gabapentin 100 mg PO TID 04/17/19 09/20/19 09/19/19





    100 mg


 


Insulin Aspart (Nf) [NovoLOG 5 units SC TIDAC 04/17/19 09/20/19 09/19/19





Flexpen]    








                                  Previous Rx's











 Medication  Instructions  Recorded  Last Taken  Type


 


Aspirin EC [Halfprin EC] 81 mg PO QDAY #30 tablet. 04/25/19 09/19/19 Rx


 


Clopidogrel [Plavix] 75 mg PO QDAY #30 tablet 04/25/19 09/19/19 Rx


 


Promethazine [Phenergan SUPPOS] 50 mg OK Q6H PRN #15 supp.rect 09/19/19 09/19/19

Rx


 


Metoclopramide [Reglan TAB] 10 mg PO ACHS #90 tablet 09/22/19 Unknown Rx


 


Ondansetron [Zofran ODT TAB] 4 mg PO Q8HR PRN #10 tab.rapdis 09/22/19 Unknown Rx


 


Pantoprazole [Protonix] 40 mg PO QDAY #30 tablet 09/22/19 Unknown Rx


 


Dicyclomine [Bentyl] 20 mg PO QID #10 tablet 11/09/19 Unknown Rx


 


Ondansetron [Zofran Odt] 4 mg PO Q8HR #10 tab.rapdis 11/09/19 Unknown Rx











                                    Allergies











Allergy/AdvReac Type Severity Reaction Status Date / Time


 


No Known Allergies Allergy   Verified 07/03/19 15:01














ED Review of Systems


ROS: 


Stated complaint: ABD PAIN


Other details as noted in HPI





Comment: All other systems reviewed and negative





ED Past Medical Hx





- Past Medical History


Hx Hypertension: Yes (reports no antihypertensive use since starting HD 2yrs 

ago)


Hx Heart Attack/AMI: No


Hx Diabetes: Yes


Hx Liver Disease: No


Hx Renal Disease: Yes (ESRD, HD M-W-F)


Hx Seizures: No


Hx HIV: No


Additional medical history: gastroporesis, High cholesterol





- Surgical History


Hx Cholecystectomy: Yes


Additional Surgical History: hernia repair. left foot, left ACL, right upper arm

graft





- Social History


Smoking Status: Never Smoker


Substance Use Type: None





- Medications


Home Medications: 


                                Home Medications











 Medication  Instructions  Recorded  Confirmed  Last Taken  Type


 


Carvedilol [Coreg] 3.125 mg PO BID 04/17/19 09/20/19 09/19/19 History


 


Ergocalciferol [Vitamin D2] 1 cap PO 1XW 04/17/19 09/20/19 09/19/19 History


 


Gabapentin 100 mg PO TID 04/17/19 09/20/19 09/19/19 History





    100 mg 


 


Insulin Aspart (Nf) [NovoLOG 5 units SC TIDAC 04/17/19 09/20/19 09/19/19 History





Flexpen]     


 


Aspirin EC [Halfprin EC] 81 mg PO QDAY #30 tablet. 04/25/19 09/20/19 09/19/19 

Rx


 


Clopidogrel [Plavix] 75 mg PO QDAY #30 tablet 04/25/19 09/20/19 09/19/19 Rx


 


Promethazine [Phenergan SUPPOS] 50 mg OK Q6H PRN #15 supp.rect 09/19/19 09/20/19 09/19/19 Rx


 


Metoclopramide [Reglan TAB] 10 mg PO ACHS #90 tablet 09/22/19  Unknown Rx


 


Ondansetron [Zofran ODT TAB] 4 mg PO Q8HR PRN #10 tab.rapdis 09/22/19  Unknown 

Rx


 


Pantoprazole [Protonix] 40 mg PO QDAY #30 tablet 09/22/19  Unknown Rx


 


Dicyclomine [Bentyl] 20 mg PO QID #10 tablet 11/09/19  Unknown Rx


 


Ondansetron [Zofran Odt] 4 mg PO Q8HR #10 tab.rapdis 11/09/19  Unknown Rx














ED Physical Exam





- General


Limitations: No Limitations


General appearance: alert, in no apparent distress





- Head


Head exam: Present: atraumatic, normocephalic





- Eye


Eye exam: Present: normal appearance





- ENT


ENT exam: Present: mucous membranes moist





- Neck


Neck exam: Present: normal inspection





- Respiratory


Respiratory exam: Present: normal lung sounds bilaterally.  Absent: respiratory 

distress, wheezes, rales, rhonchi





- Cardiovascular


Cardiovascular Exam: Present: regular rate, normal rhythm.  Absent: systolic mur

mur, diastolic murmur, rubs, gallop





- GI/Abdominal


GI/Abdominal exam: Present: soft, tenderness (epigastric), normal bowel sounds. 

Absent: distended, guarding, rebound, rigid





- Rectal


Rectal exam: Present: deferred





- Extremities Exam


Extremities exam: Present: normal inspection





- Back Exam


Back exam: Present: normal inspection





- Neurological Exam


Neurological exam: Present: alert, oriented X3





- Psychiatric


Psychiatric exam: Present: normal affect, normal mood





- Skin


Skin exam: Present: warm, dry, intact, normal color.  Absent: rash





ED Course





                                   Vital Signs











  11/09/19 11/09/19





  15:58 17:12


 


Temperature 98.4 F 


 


Pulse Rate 111 H 100 H


 


Respiratory 18 





Rate  


 


Blood Pressure 198/101 199/107


 


O2 Sat by Pulse 100 





Oximetry  














ED Medical Decision Making





- Lab Data


Result diagrams: 


                                 11/09/19 16:19





                                 11/09/19 16:19





- Medical Decision Making





Patient was given antiemetics and emergency department.  Patient's had no 

episodes of vomiting since his arrival.  Patient is at the time of discharge is 

still stating that he feels that he should've had some Dilaudid for pain but I 

do not feel as though this is warranted at this time.  Patient be discharged 

home.


Critical care attestation.: 


If time is entered above; I have spent that time in minutes in the direct care 

of this critically ill patient, excluding procedure time.








ED Disposition


Clinical Impression: 


 Gastroparesis





Disposition: DC-01 TO HOME OR SELFCARE


Is pt being admited?: No


Does the pt Need Aspirin: No


Condition: Stable


Instructions:  Acute Nausea and Vomiting (ED)


Referrals: 


YAMILEX FARIAS MD [Primary Care Provider] - 3-5 Days


Time of Disposition: 19:07

## 2020-01-16 ENCOUNTER — HOSPITAL ENCOUNTER (EMERGENCY)
Dept: HOSPITAL 5 - ED | Age: 60
Discharge: HOME | End: 2020-01-16
Payer: COMMERCIAL

## 2020-01-16 VITALS — SYSTOLIC BLOOD PRESSURE: 145 MMHG | DIASTOLIC BLOOD PRESSURE: 83 MMHG

## 2020-01-16 DIAGNOSIS — E11.43: ICD-10-CM

## 2020-01-16 DIAGNOSIS — Z90.49: ICD-10-CM

## 2020-01-16 DIAGNOSIS — I12.0: Primary | ICD-10-CM

## 2020-01-16 DIAGNOSIS — Z99.2: ICD-10-CM

## 2020-01-16 DIAGNOSIS — Z79.899: ICD-10-CM

## 2020-01-16 DIAGNOSIS — E11.22: ICD-10-CM

## 2020-01-16 DIAGNOSIS — K31.84: ICD-10-CM

## 2020-01-16 DIAGNOSIS — N18.6: ICD-10-CM

## 2020-01-16 LAB
ALBUMIN SERPL-MCNC: 4.5 G/DL (ref 3.9–5)
ALT SERPL-CCNC: 12 UNITS/L (ref 7–56)
BASOPHILS # (AUTO): 0 K/MM3 (ref 0–0.1)
BASOPHILS NFR BLD AUTO: 0.6 % (ref 0–1.8)
BUN SERPL-MCNC: 31 MG/DL (ref 9–20)
BUN/CREAT SERPL: 4 %
CALCIUM SERPL-MCNC: 9.9 MG/DL (ref 8.4–10.2)
EOSINOPHIL # BLD AUTO: 0 K/MM3 (ref 0–0.4)
EOSINOPHIL NFR BLD AUTO: 0 % (ref 0–4.3)
HCT VFR BLD CALC: 45.4 % (ref 35.5–45.6)
HEMOLYSIS INDEX: 9
HGB BLD-MCNC: 15 GM/DL (ref 11.8–15.2)
LYMPHOCYTES # BLD AUTO: 1 K/MM3 (ref 1.2–5.4)
LYMPHOCYTES NFR BLD AUTO: 12.7 % (ref 13.4–35)
MCHC RBC AUTO-ENTMCNC: 33 % (ref 32–34)
MCV RBC AUTO: 95 FL (ref 84–94)
MONOCYTES # (AUTO): 0.2 K/MM3 (ref 0–0.8)
MONOCYTES % (AUTO): 2.6 % (ref 0–7.3)
PLATELET # BLD: 344 K/MM3 (ref 140–440)
RBC # BLD AUTO: 4.76 M/MM3 (ref 3.65–5.03)

## 2020-01-16 PROCEDURE — 99284 EMERGENCY DEPT VISIT MOD MDM: CPT

## 2020-01-16 PROCEDURE — 96374 THER/PROPH/DIAG INJ IV PUSH: CPT

## 2020-01-16 PROCEDURE — 36415 COLL VENOUS BLD VENIPUNCTURE: CPT

## 2020-01-16 PROCEDURE — 74019 RADEX ABDOMEN 2 VIEWS: CPT

## 2020-01-16 PROCEDURE — 80053 COMPREHEN METABOLIC PANEL: CPT

## 2020-01-16 PROCEDURE — 85025 COMPLETE CBC W/AUTO DIFF WBC: CPT

## 2020-01-16 PROCEDURE — 96375 TX/PRO/DX INJ NEW DRUG ADDON: CPT

## 2020-01-16 NOTE — XRAY REPORT
ABDOMEN 2 VIEW(S)



INDICATION / CLINICAL INFORMATION:

Abdominal pain.



COMPARISON: 

4/14/2017.



FINDINGS:



TUBES / LINES: None.

BOWEL GAS PATTERN: Moderate amount of stool in the colon. No evidence of bowel obstruction or mass ef
fect. 

FREE AIR / EXTRALUMINAL GAS: None seen.



ADDITIONAL FINDINGS: Prior cholecystectomy.



IMPRESSION: No acute abnormality or significant change.



Signer Name: Lux Oakes MD 

Signed: 1/16/2020 7:28 AM

 Workstation Name: [id number]

## 2020-01-16 NOTE — EMERGENCY DEPARTMENT REPORT
HPI





- General


Chief Complaint: Abdominal Pain


Time Seen by Provider: 01/16/20 06:39





- HPI


HPI: 





59-year-old -American male presents to the emergency department with 

complaint of nausea and vomiting and some left upper quadrant and epigastric 

abdominal pain since about 10 AM yesterday after completing his hemodialysis.  

The patient has a past medical history of diabetes, gastroparesis, high 

cholesterol, end-stage renal disease on hemodialysis on Monday/Wednesday/Friday.

 He also has a history of hypertension and does present with elevated blood 

pressure but says that he has not on any antihypertensives as his blood pressure

has been controlled with hemodialysis.  His nephrologist is Dr. Delong and his 

primary care physician is Dr. Lee.  He has not taken anything for his 

symptoms prior to presentation today.





ED Past Medical Hx





- Past Medical History


Previous Medical History?: Yes


Hx Hypertension: Yes (reports no antihypertensive use since starting HD 2yrs 

ago)


Hx Heart Attack/AMI: No


Hx Diabetes: Yes


Hx Liver Disease: No


Hx Renal Disease: Yes (ESRD, HD M-W-F)


Hx Seizures: No


Hx HIV: No


Additional medical history: gastroporesis, High cholesterol





- Surgical History


Past Surgical History?: Yes


Hx Cholecystectomy: Yes


Additional Surgical History: hernia repair. left foot, left ACL, right upper arm

graft





- Social History


Smoking Status: Never Smoker


Substance Use Type: None





- Medications


Home Medications: 


                                Home Medications











 Medication  Instructions  Recorded  Confirmed  Last Taken  Type


 


Ergocalciferol [Vitamin D2] 1 cap PO 1XW 04/17/19 09/20/19 09/19/19 History


 


Gabapentin 100 mg PO TID 04/17/19 09/20/19 09/19/19 History





    100 mg 


 


Insulin Aspart (Nf) [NovoLOG 5 units SC TIDAC 04/17/19 09/20/19 09/19/19 History





Flexpen]     


 


carvediloL [Coreg] 3.125 mg PO BID 04/17/19 09/20/19 09/19/19 History


 


Aspirin EC [Halfprin EC] 81 mg PO QDAY #30 tablet. 04/25/19 09/20/19 09/19/19 

Rx


 


Clopidogrel [Plavix] 75 mg PO QDAY #30 tablet 04/25/19 09/20/19 09/19/19 Rx


 


Promethazine [Phenergan SUPPOS] 50 mg [place] Q6H PRN #15 supp.rect 09/19/19 09/20/19 09/19/19 Rx


 


Metoclopramide [Reglan TAB] 10 mg PO ACHS #90 tablet 09/22/19  Unknown Rx


 


Ondansetron [Zofran ODT TAB] 4 mg PO Q8HR PRN #10 tab.rapdis 09/22/19  Unknown 

Rx


 


Pantoprazole [Protonix] 40 mg PO QDAY #30 tablet 09/22/19  Unknown Rx


 


Dicyclomine [Bentyl] 20 mg PO QID #10 tablet 11/09/19  Unknown Rx


 


Ondansetron [Zofran Odt] 4 mg PO Q8HR #10 tab.rapdis 11/09/19  Unknown Rx


 


Ondansetron [Zofran Odt] 4 mg PO Q8HR PRN #15 tab.rapdis 01/16/20  Unknown Rx














ED Review of Systems


ROS: 


Stated complaint: ABDOMINAL PAIN,NAUSEA


Other details as noted in HPI





Comment: All other systems reviewed and negative


Constitutional: denies: chills, fever


Eyes: denies: eye pain, vision change


ENT: denies: ear pain, throat pain


Respiratory: denies: cough, shortness of breath


Cardiovascular: denies: chest pain, palpitations


Gastrointestinal: abdominal pain, nausea, vomiting


Genitourinary: denies: dysuria, discharge


Musculoskeletal: denies: back pain, arthralgia


Skin: denies: rash, lesions


Neurological: denies: headache, weakness





Physical Exam





- Physical Exam


Vital Signs: 


                                   Vital Signs











  01/16/20





  05:51


 


Temperature 99.3 F


 


Pulse Rate 116 H


 


Respiratory 18





Rate 


 


Blood Pressure 188/103


 


O2 Sat by Pulse 98





Oximetry 











Physical Exam: 





GENERAL: The patient is well-developed well-nourished.


HEENT: Normocephalic.  Atraumatic.  Patient has moist mucous membranes.


EYES: Extraocular motions are intact.  


NECK: Supple.  Trachea is midline. 


CHEST/LUNGS: Clear to auscultation.  There is no respiratory distress noted.


HEART/CARDIOVASCULAR: Regular.  There is no tachycardia.  There is no murmur.


ABDOMEN: Abdomen is soft.  Mild epigastric tenderness to palpation.  No g

uarding.  Patient has normal bowel sounds.  There is no abdominal distention.


SKIN:Skin is warm and dry. .


NEURO: The patient is awake, alert, and oriented.  The patient is cooperative.  

The patient has no focal neurologic deficits.  Normal speech. 


MUSCULOSKELETAL: There is no tenderness or deformity. There is no evidence of 

acute injury.





ED Course


                                   Vital Signs











  01/16/20





  05:51


 


Temperature 99.3 F


 


Pulse Rate 116 H


 


Respiratory 18





Rate 


 


Blood Pressure 188/103


 


O2 Sat by Pulse 98





Oximetry 














ED Medical Decision Making





- Lab Data


Result diagrams: 


                                 01/16/20 06:10





                                 01/16/20 06:10





- Radiology Data


Radiology results: image reviewed


interpreted by me: 





Abdominal x-ray shows nonspecific nonobstructive bowel gas.





- Medical Decision Making





This patient presents with a few days of some nausea, vomiting and upper 

abdominal pain.  His abdomen is soft, nondistended and nontoxic in appearance.  

Patient presents with elevated blood pressure but came down to a more reasonable

level with some antihypertensive medication given.  Patient was reevaluated 

multiple times over multiple hours and there has been no further nausea or 

vomiting.  Abdominal x-ray shows nonspecific nonobstructive bowel gas.  Labs are

unremarkable except for the renal insufficiency consistent with his end-stage 

renal disease.  The patient will be discharged home with antiemetic 

prescription.  He is to follow-up with his primary care physician and continue 

with his normal dialysis regimen.  He has been instructed to return to the 

emergency Department with any worsening of his symptoms or any acute distress.





- Differential Diagnosis


gastroparesis, gastroenteritis, colitis, diverticulitis


Critical Care Time: No


Critical care attestation.: 


If time is entered above; I have spent that time in minutes in the direct care 

of this critically ill patient, excluding procedure time.








ED Disposition


Clinical Impression: 


 ESRD (end stage renal disease), Gastroparesis





HTN (hypertension)


Qualifiers:


 Hypertension type: essential hypertension Qualified Code(s): I10 - Essential 

(primary) hypertension





Disposition: DC-01 TO HOME OR SELFCARE


Is pt being admited?: No


Condition: Stable


Instructions:  Acute Nausea and Vomiting (ED), Abdominal Pain (ED), Hypertension

(ED), End-Stage Kidney Disease (ED)


Additional Instructions: 


Please continue with her normal dialysis regimen.  Please follow-up with your 

primary care physician in the next few days.  Return to the emergency Department

with any worsening of your symptoms or any acute distress.


Prescriptions: 


Ondansetron [Zofran Odt] 4 mg PO Q8HR PRN #15 tab.rapdis


 PRN Reason: Nausea


Referrals: 


HANS DELONG MD [Staff Physician] - 2-3 Days


PCP, Your [Other] - 2-3 Days


Time of Disposition: 08:49

## 2020-01-17 ENCOUNTER — HOSPITAL ENCOUNTER (EMERGENCY)
Dept: HOSPITAL 5 - ED | Age: 60
LOS: 1 days | Discharge: HOME | End: 2020-01-18
Payer: COMMERCIAL

## 2020-01-17 DIAGNOSIS — E11.43: ICD-10-CM

## 2020-01-17 DIAGNOSIS — E11.22: ICD-10-CM

## 2020-01-17 DIAGNOSIS — Z79.899: ICD-10-CM

## 2020-01-17 DIAGNOSIS — N18.6: ICD-10-CM

## 2020-01-17 DIAGNOSIS — Z98.890: ICD-10-CM

## 2020-01-17 DIAGNOSIS — I12.0: Primary | ICD-10-CM

## 2020-01-17 DIAGNOSIS — K31.84: ICD-10-CM

## 2020-01-17 DIAGNOSIS — E78.00: ICD-10-CM

## 2020-01-17 DIAGNOSIS — Z90.49: ICD-10-CM

## 2020-01-17 LAB
ALBUMIN SERPL-MCNC: 4.1 G/DL (ref 3.9–5)
ALT SERPL-CCNC: 14 UNITS/L (ref 7–56)
BUN SERPL-MCNC: 25 MG/DL (ref 9–20)
BUN/CREAT SERPL: 4 %
CALCIUM SERPL-MCNC: 9.8 MG/DL (ref 8.4–10.2)
HCT VFR BLD CALC: 46.2 % (ref 35.5–45.6)
HEMOLYSIS INDEX: 5
HGB BLD-MCNC: 15.1 GM/DL (ref 11.8–15.2)
MCHC RBC AUTO-ENTMCNC: 33 % (ref 32–34)
MCV RBC AUTO: 97 FL (ref 84–94)
PLATELET # BLD: 302 K/MM3 (ref 140–440)
RBC # BLD AUTO: 4.78 M/MM3 (ref 3.65–5.03)

## 2020-01-17 PROCEDURE — 96374 THER/PROPH/DIAG INJ IV PUSH: CPT

## 2020-01-17 PROCEDURE — 80053 COMPREHEN METABOLIC PANEL: CPT

## 2020-01-17 PROCEDURE — 85027 COMPLETE CBC AUTOMATED: CPT

## 2020-01-17 PROCEDURE — 83690 ASSAY OF LIPASE: CPT

## 2020-01-17 PROCEDURE — 99284 EMERGENCY DEPT VISIT MOD MDM: CPT

## 2020-01-17 PROCEDURE — 96376 TX/PRO/DX INJ SAME DRUG ADON: CPT

## 2020-01-17 PROCEDURE — 83735 ASSAY OF MAGNESIUM: CPT

## 2020-01-17 PROCEDURE — 93010 ELECTROCARDIOGRAM REPORT: CPT

## 2020-01-17 PROCEDURE — 93005 ELECTROCARDIOGRAM TRACING: CPT

## 2020-01-17 PROCEDURE — 82550 ASSAY OF CK (CPK): CPT

## 2020-01-17 PROCEDURE — 74176 CT ABD & PELVIS W/O CONTRAST: CPT

## 2020-01-17 PROCEDURE — 36415 COLL VENOUS BLD VENIPUNCTURE: CPT

## 2020-01-17 PROCEDURE — 96375 TX/PRO/DX INJ NEW DRUG ADDON: CPT

## 2020-01-17 PROCEDURE — 96372 THER/PROPH/DIAG INJ SC/IM: CPT

## 2020-01-17 NOTE — EMERGENCY DEPARTMENT REPORT
ED General Adult HPI





- General


Chief complaint: Abdominal Pain


Stated complaint: NAUSEA/STOMACH PAIN


Time Seen by Provider: 01/17/20 20:22


Source: patient, RN notes reviewed, old records reviewed


Mode of arrival: Ambulatory


Limitations: No Limitations





- History of Present Illness


Initial comments: 





Nephrology: Dr. Delong





Gastroenterology: Dr. Cruz





This is a 59-year-old gentleman.  I have evaluated this patient in the past.  

His past medical history includes diabetes, end-stage renal disease on 

hemodialysis, Monday, Wednesday, Friday, reports that he was dialyzed today, and

reports they took off 3 L.  He also has a history of gastroparesis.





The patient was admitted to this hospital in 2019 for abdominal pain, nausea and

vomiting, seen by gastroenterology, had EGD performed, showed no gastric outlet 

obstruction, demonstrated gastritis, and gastric antral polyps.  He is suspected

of having a diagnosis of gastroparesis.  He presents to the ER with complaint of

diffuse sharp crampy abdominal pain, throbbing, all over, associated with 50-60 

episodes of nonbloody, nonbilious emesis.  Symptoms started after his 

hemodialysis.  He was reportedly seen in this department yesterday for similar 

symptoms.  He reports that his symptoms were improved yesterday upon discharge. 

There is no complaint of headache, neck pain, new or different shortness of 

breath and he denies urinary symptoms.  He has chronic dark stool secondary to 

medications, and he denies urinary symptoms.


-: Gradual


Location: abdomen


Quality: other


Consistency: other


Improves with: other


Worsens with: other





- Related Data


                                Home Medications











 Medication  Instructions  Recorded  Confirmed  Last Taken


 


Ergocalciferol [Vitamin D2] 1 cap PO 1XW 04/17/19 09/20/19 09/19/19


 


Gabapentin 100 mg PO TID 04/17/19 09/20/19 09/19/19





    100 mg


 


Insulin Aspart (Nf) [NovoLOG 5 units SC TIDAC 04/17/19 09/20/19 09/19/19





Flexpen]    


 


carvediloL [Coreg] 3.125 mg PO BID 04/17/19 09/20/19 09/19/19








                                  Previous Rx's











 Medication  Instructions  Recorded  Last Taken  Type


 


Aspirin EC [Halfprin EC] 81 mg PO QDAY #30 tablet. 04/25/19 09/19/19 Rx


 


Clopidogrel [Plavix] 75 mg PO QDAY #30 tablet 04/25/19 09/19/19 Rx


 


Metoclopramide [Reglan TAB] 10 mg PO ACHS #90 tablet 09/22/19 Unknown Rx


 


Ondansetron [Zofran ODT TAB] 4 mg PO Q8HR PRN #10 tab.rapdis 09/22/19 Unknown Rx


 


Pantoprazole [Protonix] 40 mg PO QDAY #30 tablet 09/22/19 Unknown Rx


 


Dicyclomine [Bentyl] 20 mg PO QID #10 tablet 11/09/19 Unknown Rx


 


Ondansetron [Zofran Odt] 4 mg PO Q8HR #10 tab.rapdis 11/09/19 Unknown Rx


 


Ondansetron [Zofran Odt] 4 mg PO Q8HR PRN #15 tab.rapdis 01/16/20 Unknown Rx


 


Acetaminophen [Non-Aspirin Extra 500 mg PO Q6HR PRN #30 tablet 01/18/20 Unknown 

Rx





Strength]    


 


Famotidine [Pepcid] 20 mg PO BID #30 tablet 01/18/20 Unknown Rx


 


Ginger Root [Ginger] 250 mg PO QID PRN #30 capsule 01/18/20 Unknown Rx


 


Metoclopramide [Reglan] 10 mg PO QID PRN #30 tablet 01/18/20 Unknown Rx


 


Promethazine [Phenergan SUPPOS] 50 mg [place] Q6H PRN #15 supp.rect 01/18/20 Unknown 

Rx











                                    Allergies











Allergy/AdvReac Type Severity Reaction Status Date / Time


 


No Known Allergies Allergy   Verified 07/03/19 15:01














ED Review of Systems


ROS: 


Stated complaint: NAUSEA/STOMACH PAIN


Other details as noted in HPI





Constitutional: malaise.  denies: fever


Eyes: denies: eye discharge


ENT: denies: congestion


Respiratory: denies: wheezing


Cardiovascular: denies: syncope


Gastrointestinal: abdominal pain, nausea, vomiting


Genitourinary: denies: dysuria


Skin: denies: lesions


Neurological: weakness


Hematological/Lymphatic: denies: easy bleeding





ED Past Medical Hx





- Past Medical History


Previous Medical History?: Yes


Hx Hypertension: Yes (reports no antihypertensive use since starting HD 2yrs 

ago)


Hx Heart Attack/AMI: No


Hx Diabetes: Yes


Hx Liver Disease: No


Hx Renal Disease: Yes (ESRD, HD M-W-F)


Hx Seizures: No


Hx HIV: No


Additional medical history: gastroporesis, High cholesterol





- Surgical History


Past Surgical History?: Yes


Hx Cholecystectomy: Yes


Additional Surgical History: hernia repair. left foot, left ACL, right upper arm

graft





- Social History


Smoking Status: Never Smoker


Substance Use Type: None





- Medications


Home Medications: 


                                Home Medications











 Medication  Instructions  Recorded  Confirmed  Last Taken  Type


 


Ergocalciferol [Vitamin D2] 1 cap PO 1XW 04/17/19 09/20/19 09/19/19 History


 


Gabapentin 100 mg PO TID 04/17/19 09/20/19 09/19/19 History





    100 mg 


 


Insulin Aspart (Nf) [NovoLOG 5 units SC TIDAC 04/17/19 09/20/19 09/19/19 History





Flexpen]     


 


carvediloL [Coreg] 3.125 mg PO BID 04/17/19 09/20/19 09/19/19 History


 


Aspirin EC [Halfprin EC] 81 mg PO QDAY #30 tablet. 04/25/19 09/20/19 09/19/19 

Rx


 


Clopidogrel [Plavix] 75 mg PO QDAY #30 tablet 04/25/19 09/20/19 09/19/19 Rx


 


Metoclopramide [Reglan TAB] 10 mg PO ACHS #90 tablet 09/22/19  Unknown Rx


 


Ondansetron [Zofran ODT TAB] 4 mg PO Q8HR PRN #10 tab.rapdis 09/22/19  Unknown 

Rx


 


Pantoprazole [Protonix] 40 mg PO QDAY #30 tablet 09/22/19  Unknown Rx


 


Dicyclomine [Bentyl] 20 mg PO QID #10 tablet 11/09/19  Unknown Rx


 


Ondansetron [Zofran Odt] 4 mg PO Q8HR #10 tab.rapdis 11/09/19  Unknown Rx


 


Ondansetron [Zofran Odt] 4 mg PO Q8HR PRN #15 tab.rapdis 01/16/20  Unknown Rx


 


Acetaminophen [Non-Aspirin Extra 500 mg PO Q6HR PRN #30 tablet 01/18/20  Unknown

 Rx





Strength]     


 


Famotidine [Pepcid] 20 mg PO BID #30 tablet 01/18/20  Unknown Rx


 


Ginger Root [Ginger] 250 mg PO QID PRN #30 capsule 01/18/20  Unknown Rx


 


Metoclopramide [Reglan] 10 mg PO QID PRN #30 tablet 01/18/20  Unknown Rx


 


Promethazine [Phenergan SUPPOS] 50 mg [place] Q6H PRN #15 supp.rect 01/18/20  Unknown

Rx














ED Physical Exam





- General


Limitations: No Limitations


General appearance: alert, obese





- Head


Head exam: Present: atraumatic, normocephalic





- Eye


Eye exam: Present: normal appearance, EOMI.  Absent: nystagmus





- ENT


ENT exam: Present: normal exam, normal orophraynx, mucous membranes moist, 

normal external ear exam





- Neck


Neck exam: Present: normal inspection, full ROM.  Absent: tenderness, meningism

us





- Respiratory


Respiratory exam: Present: normal lung sounds bilaterally.  Absent: respiratory 

distress





- Cardiovascular


Cardiovascular Exam: Present: normal rhythm, tachycardia, normal heart sounds.  

Absent: systolic murmur, diastolic murmur, rubs, gallop





- GI/Abdominal


GI/Abdominal exam: Present: soft, tenderness (there is epigastric abdominal 

tenderness.  There is no rebound.  There are no peritoneal signs.).  Absent: 

distended, guarding, rebound, rigid, pulsatile mass





- Rectal


Rectal exam: Present: deferred





- Extremities Exam


Extremities exam: Present: normal inspection (is up her extremity fistula, 

without redness, pus or streaking.  There is no palpable cord noted.  Bilateral 

lower extremities appear to be symmetric in size.), full ROM, pedal edema, other

(2+ pulses noted in the bilateral upper and lower extremities.  The pelvis is 

stable.  There is no long bony tenderness.  The muscular compartments are soft. 

There is no redness, pus, streaking or erythema.).  Absent: calf tenderness





- Back Exam


Back exam: Present: normal inspection, full ROM.  Absent: tenderness, CVA 

tenderness (R), CVA tenderness (L), paraspinal tenderness, vertebral tenderness





- Neurological Exam


Neurological exam: Present: alert, other (there is no facial droop.  The tongue 

is midline.  Extraocular movements are intact bilaterally.  Speaking in full 

sentences.  Hearing is grossly intact.  5 out of 5 strength bilateral upper and 

lower extremities.  Sensation is intact to light touch bilateral upper and lower

extremities.).  Absent: motor sensory deficit





- Psychiatric


Psychiatric exam: Present: anxious





- Skin


Skin exam: Present: warm, dry, intact, normal color.  Absent: rash





ED Course


                                   Vital Signs











  01/17/20 01/17/20 01/17/20





  19:48 20:46 21:01


 


Temperature 98.4 F  


 


Pulse Rate 110 H 77 101 H


 


Respiratory 18 13 12





Rate   


 


Blood Pressure 206/119  


 


O2 Sat by Pulse 97 99 99





Oximetry   














  01/17/20 01/17/20 01/17/20





  21:13 21:15 21:30


 


Temperature   


 


Pulse Rate 96 H 94 H 96 H


 


Respiratory  16 22





Rate   


 


Blood Pressure 187/95 184/97 185/98


 


O2 Sat by Pulse  92 85





Oximetry   














  01/17/20 01/17/20 01/17/20





  21:45 22:00 22:15


 


Temperature   


 


Pulse Rate 97 H 98 H 97 H


 


Respiratory 18 20 16





Rate   


 


Blood Pressure 194/101 171/91 182/97


 


O2 Sat by Pulse 90 90 92





Oximetry   














  01/17/20 01/17/20 01/17/20





  22:30 22:45 23:00


 


Temperature   


 


Pulse Rate 97 H 98 H 98 H


 


Respiratory 14 11 L 14





Rate   


 


Blood Pressure 191/102 186/99 185/100


 


O2 Sat by Pulse 92 93 94





Oximetry   














  01/17/20 01/17/20 01/17/20





  23:15 23:30 23:45


 


Temperature   


 


Pulse Rate 100 H 99 H 101 H


 


Respiratory 12 22 23





Rate   


 


Blood Pressure 183/100 175/95 166/85


 


O2 Sat by Pulse 95 93 93





Oximetry   














  01/18/20 01/18/20 01/18/20





  00:01 00:03 00:11


 


Temperature   


 


Pulse Rate 98 H 98 H 


 


Respiratory 22 19 16





Rate   


 


Blood Pressure 154/85 154/85 


 


O2 Sat by Pulse 93 96 





Oximetry   














  01/18/20 01/18/20 01/18/20





  00:15 00:30 01:07


 


Temperature   


 


Pulse Rate 99 H 100 H 81


 


Respiratory 25 H 19 





Rate   


 


Blood Pressure 159/87 161/84 161/84


 


O2 Sat by Pulse 93 93 





Oximetry   














  01/18/20





  01:15


 


Temperature 


 


Pulse Rate 98 H


 


Respiratory 18





Rate 


 


Blood Pressure 178/97


 


O2 Sat by Pulse 95





Oximetry 














- Reevaluation(s)


Reevaluation #1: 





01/17/20 21:08


Differential diagnosis, including but not limited to: Colitis, obstruction, 

volvulus, gastroparesis, abdominal wall pain, hyperkalemia, azotemia, uremia, 

hypertension secondary to abdominal pain, hypertension secondary to renal 

insufficiency














Assessment and plan: 59-year-old gentleman with probable recurrence of 

gastroparesis flare exacerbation.  He is afebrile with reassuring vital signs 

with the exception of tachycardia and hypertension.  We will obtain laboratory 

studies, EKG, noncontrast CT scan of the abdomen pelvis, provide nausea 

medicine, pain medicine and reassess.








Reevaluation #2: 





01/18/20 01:43


CT scan abdomen and pelvis negative for acute disease.  No active vomiting.  

Hypertension improved.  This is likely an exacerbation of chronic hypertension 

and underlying gastroparesis.





ED Medical Decision Making





- Lab Data


Result diagrams: 


                                 01/17/20 20:48





                                 01/17/20 20:48








                                   Vital Signs











  01/17/20





  19:48


 


Temperature 98.4 F


 


Pulse Rate 110 H


 


Respiratory 18





Rate 


 


Blood Pressure 206/119


 


O2 Sat by Pulse 97





Oximetry 











                                   Lab Results











  01/17/20 Range/Units





  20:48 


 


WBC  8.0  (4.5-11.0)  K/mm3


 


RBC  4.78  (3.65-5.03)  M/mm3


 


Hgb  15.1  (11.8-15.2)  gm/dl


 


Hct  46.2 H  (35.5-45.6)  %


 


MCV  97 H  (84-94)  fl


 


MCH  32  (28-32)  pg


 


MCHC  33  (32-34)  %


 


RDW  18.5 H  (13.2-15.2)  %


 


Plt Count  302  (140-440)  K/mm3














- EKG Data


-: EKG Interpreted by Me


EKG shows normal: sinus rhythm


Rate: tachycardia





- EKG Data





01/17/20 21:09


The EKG shows a sinus tachycardia, with a left axis deviation, right bundle-

branch block, prolonged QTC, slight motion artifact, unchanged from prior EKG 

from 09/19/2019.  This is not a STEMI.  Rate 102 bpm





- Radiology Data


Radiology results: report reviewed, image reviewed


Critical care attestation.: 


If time is entered above; I have spent that time in minutes in the direct care 

of this critically ill patient, excluding procedure time.








ED Disposition


Clinical Impression: 


 ESRD (end stage renal disease), Gastroparesis, HTN (hypertension)





Disposition: DC-01 TO HOME OR SELFCARE


Is pt being admited?: No


Does the pt Need Aspirin: No


Condition: Stable


Additional Instructions: 


Symptoms likely coming from diabetic gastroparesis.  Symptoms will likely wax 

and wane.  It is very difficult to cure diabetic gastroparesis.  Avoid 

consumption of Motrin, ibuprofen, Naprosyn, Aleve, ibuprofen.  Avoid consumption

of sugary drinks, sweets, and foods.  Take the pain medication, nausea 

medications as needed and/or directed.  Patient may take ginger tablets as d

irected/needed for nausea, vomiting, Reglan medication as needed for nausea and 

vomiting, and Phenergan suppositories needed for intractable nausea and 

vomiting.





Please continue current outpatient blood pressure medication.  Patient was found

to be hypertensive with high blood pressure in the emergency room.  The patient 

should follow-up with his primary care doctor or kidney doctor for his hyper

tension within the next 4 weeks.  Long-term complications of hypertension and 

elevated blood pressure includes stroke, heart attack, disability, paralysis, 

loss of quality of life.





Return to the emergency room right away with projectile vomiting, change in 

mental status, confusion, inability to tolerate liquid feeds, new, worsened or 

different symptoms not present on the initial emergency room evaluation.








Referrals: 


SHARAN CRUZ MD [Staff Physician] - 3-5 Days

## 2020-01-18 VITALS — DIASTOLIC BLOOD PRESSURE: 96 MMHG | SYSTOLIC BLOOD PRESSURE: 170 MMHG

## 2020-01-18 NOTE — CAT SCAN REPORT
CT ABDOMEN AND PELVIS WITHOUT IV CONTRAST



INDICATION:

MAIN: GENERALIZED abd pain, NAUSEA.



COMPARISON:

9/20/2019.



TECHNIQUE:

All CT scans at this facility use dose modulation, automated exposure control, iterative reconstructi
on or weight based dosing, when appropriate, to reduce radiation dose to as low as reasonably achieva
ble.



FINDINGS:



Lung Bases: No significant abnormality.



Skeletal System: No acute abnormality.



ABDOMEN:

Liver: No significant abnormality.

Gallbladder: No significant abnormality.  

Bile Ducts: No significant abnormality.

Pancreas: No significant abnormality.

Spleen: No significant abnormality.

Adrenals: No significant abnormality.

Right Kidney: No significant abnormality.

Left Kidney: No significant abnormality.

Upper GI tract: No significant abnormality.

Lymph Nodes: No significant adenopathy.

Aorta: No significant abnormality. 

Additional Findings: Small periumbilical ventral hernias, some of which contain small bowel, are unch
anged.



PELVIS:

Colon: No acute abnormality.  Diverticulosis is noted.

Urinary Bladder and Distal Ureters: No significant abnormality.

Appendix: No significant abnormality.  

Lymph Nodes: No significant adenopathy.

Additional Findings: Prostate is mildly enlarged.





IMPRESSION:

1.  Within the limitations of non contrast technique, no acute process in the abdomen or pelvis.

2.  Incidental findings, as above.



Signer Name: Shin Choudhury MD 

Signed: 1/18/2020 1:14 AM

 Workstation Name: [id number]

## 2020-03-02 ENCOUNTER — HOSPITAL ENCOUNTER (EMERGENCY)
Dept: HOSPITAL 5 - ED | Age: 60
Discharge: LEFT BEFORE DISCHARGE | End: 2020-03-02
Payer: COMMERCIAL

## 2020-03-02 VITALS — DIASTOLIC BLOOD PRESSURE: 112 MMHG | SYSTOLIC BLOOD PRESSURE: 205 MMHG

## 2020-03-02 DIAGNOSIS — Z53.21: ICD-10-CM

## 2020-03-02 DIAGNOSIS — R10.9: Primary | ICD-10-CM

## 2020-03-02 LAB
ALBUMIN SERPL-MCNC: 4.2 G/DL (ref 3.9–5)
ALT SERPL-CCNC: 10 UNITS/L (ref 7–56)
BASOPHILS # (AUTO): 0 K/MM3 (ref 0–0.1)
BASOPHILS NFR BLD AUTO: 0.4 % (ref 0–1.8)
BUN SERPL-MCNC: 30 MG/DL (ref 9–20)
BUN/CREAT SERPL: 4 %
CALCIUM SERPL-MCNC: 9.8 MG/DL (ref 8.4–10.2)
EOSINOPHIL # BLD AUTO: 0.1 K/MM3 (ref 0–0.4)
EOSINOPHIL NFR BLD AUTO: 0.9 % (ref 0–4.3)
HCT VFR BLD CALC: 41.3 % (ref 35.5–45.6)
HEMOLYSIS INDEX: 75
HGB BLD-MCNC: 13.8 GM/DL (ref 11.8–15.2)
LYMPHOCYTES # BLD AUTO: 1.9 K/MM3 (ref 1.2–5.4)
LYMPHOCYTES NFR BLD AUTO: 33.8 % (ref 13.4–35)
MCHC RBC AUTO-ENTMCNC: 33 % (ref 32–34)
MCV RBC AUTO: 89 FL (ref 84–94)
MONOCYTES # (AUTO): 0.6 K/MM3 (ref 0–0.8)
MONOCYTES % (AUTO): 11.3 % (ref 0–7.3)
PLATELET # BLD: 244 K/MM3 (ref 140–440)
RBC # BLD AUTO: 4.64 M/MM3 (ref 3.65–5.03)

## 2020-03-02 PROCEDURE — 85025 COMPLETE CBC W/AUTO DIFF WBC: CPT

## 2020-03-02 PROCEDURE — 80053 COMPREHEN METABOLIC PANEL: CPT

## 2020-03-02 PROCEDURE — 83690 ASSAY OF LIPASE: CPT

## 2020-03-02 PROCEDURE — 36415 COLL VENOUS BLD VENIPUNCTURE: CPT

## 2020-03-02 NOTE — EVENT NOTE
ED Screening Note


ED Screening Note: 





n/v that began today after dialysis 


no diarrhea


generalized abd pain 


PMHx ESRD-dialysis, HTN


no allergies to meds





This initial assessment/diagnostic orders/clinical plan/treatment(s) is/are 

subject to change based on patients health status, clinical progression and re-

assessment by fellow clinical providers in the ED. Further treatment and workup 

at subsequent clinical providers discretion. Patient/guardian urged not to elope

from the ED as their condition may be serious if not clinically assessed and 

managed. 





Initial orders include: labs

## 2024-10-10 ENCOUNTER — OFFICE VISIT (OUTPATIENT)
Dept: URBAN - METROPOLITAN AREA CLINIC 118 | Facility: CLINIC | Age: 64
End: 2024-10-10
Payer: COMMERCIAL

## 2024-10-10 VITALS
WEIGHT: 273.6 LBS | HEART RATE: 87 BPM | TEMPERATURE: 97.9 F | HEIGHT: 70 IN | DIASTOLIC BLOOD PRESSURE: 66 MMHG | BODY MASS INDEX: 39.17 KG/M2 | SYSTOLIC BLOOD PRESSURE: 112 MMHG

## 2024-10-10 DIAGNOSIS — K31.84 GASTROPARESIS: ICD-10-CM

## 2024-10-10 DIAGNOSIS — K59.00 CONSTIPATION, UNSPECIFIED CONSTIPATION TYPE: ICD-10-CM

## 2024-10-10 PROBLEM — 235675006: Status: ACTIVE | Noted: 2024-10-10

## 2024-10-10 PROBLEM — 14760008: Status: ACTIVE | Noted: 2024-10-10

## 2024-10-10 PROCEDURE — 99204 OFFICE O/P NEW MOD 45 MIN: CPT | Performed by: INTERNAL MEDICINE

## 2024-10-10 RX ORDER — GABAPENTIN 800 MG/1
1 TABLET TABLET, FILM COATED ORAL ONCE A DAY
Status: ACTIVE | COMMUNITY

## 2024-10-10 RX ORDER — ONDANSETRON 4 MG/1
1 TABLET ON THE TONGUE AND ALLOW TO DISSOLVE TABLET, ORALLY DISINTEGRATING ORAL
Qty: 30 TABLET | Refills: 5 | OUTPATIENT
Start: 2024-10-12

## 2024-10-10 RX ORDER — FUROSEMIDE 20 MG/1
1 TABLET TABLET ORAL ONCE A DAY
Status: ACTIVE | COMMUNITY

## 2024-10-10 RX ORDER — NIFEDIPINE 30 MG/1
1 TABLET ON AN EMPTY STOMACH TABLET, EXTENDED RELEASE ORAL ONCE A DAY
Status: ACTIVE | COMMUNITY

## 2024-10-10 RX ORDER — PREDNISONE 5 MG/1
1 TABLET TABLET ORAL ONCE A DAY
Status: ACTIVE | COMMUNITY

## 2024-10-10 RX ORDER — MYCOPHENOLATE MOFETIL 500 MG/1
1 TABLET TABLET ORAL TWICE A DAY
Status: ACTIVE | COMMUNITY

## 2024-10-10 RX ORDER — ATORVASTATIN CALCIUM 20 MG/1
1 TABLET TABLET, FILM COATED ORAL ONCE A DAY
Status: ACTIVE | COMMUNITY

## 2024-10-10 NOTE — HPI-TODAY'S VISIT:
Patient is a 64 yo male here for F/U of recent gastroparesis flare and constipation. Seen by our group IP in 2019 with no signs of GOO on EGD. Advised DM control for suspected diabetic gastroparesis.  Followed with outside gastro since. EGD/colon 2021 per referral.  Admitted at [facility] last month x flare/volume depletion. Taken off Reglan d/t QT prolonged & lack of efficacy. Per pt no flare in over a year. Started Ozempic few months before and became more constipated. Hard pellet stools q3-4 days. No diarrhea. Denies black/bloody emesis or stool.  No more N/V since discharged and off Ozempic. Well-controlled on Zofran PRN. Denies other UGI sxs or alarm features. No unintentional weight loss. Of note, abdominal XR 9/15/24-  No acute findings. Stool present throughout the colon.

## 2024-10-12 ENCOUNTER — DASHBOARD ENCOUNTERS (OUTPATIENT)
Age: 64
End: 2024-10-12